# Patient Record
Sex: FEMALE | Race: WHITE | NOT HISPANIC OR LATINO
[De-identification: names, ages, dates, MRNs, and addresses within clinical notes are randomized per-mention and may not be internally consistent; named-entity substitution may affect disease eponyms.]

---

## 2017-03-16 ENCOUNTER — APPOINTMENT (OUTPATIENT)
Dept: GYNECOLOGIC ONCOLOGY | Facility: CLINIC | Age: 82
End: 2017-03-16

## 2017-03-16 VITALS
WEIGHT: 142 LBS | BODY MASS INDEX: 26.13 KG/M2 | SYSTOLIC BLOOD PRESSURE: 120 MMHG | DIASTOLIC BLOOD PRESSURE: 80 MMHG | HEIGHT: 62 IN

## 2017-09-28 ENCOUNTER — APPOINTMENT (OUTPATIENT)
Dept: GYNECOLOGIC ONCOLOGY | Facility: CLINIC | Age: 82
End: 2017-09-28
Payer: MEDICARE

## 2017-09-28 PROCEDURE — 99213 OFFICE O/P EST LOW 20 MIN: CPT

## 2018-03-29 ENCOUNTER — APPOINTMENT (OUTPATIENT)
Dept: GYNECOLOGIC ONCOLOGY | Facility: CLINIC | Age: 83
End: 2018-03-29
Payer: MEDICARE

## 2018-03-29 VITALS
DIASTOLIC BLOOD PRESSURE: 80 MMHG | SYSTOLIC BLOOD PRESSURE: 110 MMHG | WEIGHT: 145 LBS | BODY MASS INDEX: 26.68 KG/M2 | HEIGHT: 62 IN

## 2018-03-29 PROCEDURE — 99213 OFFICE O/P EST LOW 20 MIN: CPT

## 2018-03-29 RX ORDER — NIFEDIPINE 30 MG/1
30 TABLET, FILM COATED, EXTENDED RELEASE ORAL
Qty: 90 | Refills: 0 | Status: COMPLETED | COMMUNITY
Start: 2018-01-15

## 2018-03-29 RX ORDER — AMOXICILLIN 500 MG/1
500 TABLET, FILM COATED ORAL
Qty: 30 | Refills: 0 | Status: COMPLETED | COMMUNITY
Start: 2017-11-10

## 2018-03-29 RX ORDER — AZITHROMYCIN 250 MG/1
250 TABLET, FILM COATED ORAL
Qty: 6 | Refills: 0 | Status: COMPLETED | COMMUNITY
Start: 2018-01-16

## 2018-03-29 RX ORDER — METHYLPREDNISOLONE 4 MG/1
4 TABLET ORAL
Qty: 21 | Refills: 0 | Status: COMPLETED | COMMUNITY
Start: 2018-01-16

## 2018-03-29 RX ORDER — POTASSIUM CHLORIDE 1500 MG/1
20 TABLET, EXTENDED RELEASE ORAL
Qty: 90 | Refills: 0 | Status: ACTIVE | COMMUNITY
Start: 2018-01-24

## 2018-03-29 RX ORDER — LEVOFLOXACIN 250 MG/1
250 TABLET, FILM COATED ORAL
Qty: 10 | Refills: 0 | Status: COMPLETED | COMMUNITY
Start: 2017-12-11

## 2018-10-11 ENCOUNTER — APPOINTMENT (OUTPATIENT)
Dept: GYNECOLOGIC ONCOLOGY | Facility: CLINIC | Age: 83
End: 2018-10-11
Payer: MEDICARE

## 2018-10-11 VITALS
BODY MASS INDEX: 26.31 KG/M2 | WEIGHT: 143 LBS | DIASTOLIC BLOOD PRESSURE: 78 MMHG | HEIGHT: 62 IN | SYSTOLIC BLOOD PRESSURE: 125 MMHG

## 2018-10-11 DIAGNOSIS — N89.8 OTHER SPECIFIED NONINFLAMMATORY DISORDERS OF VAGINA: ICD-10-CM

## 2018-10-11 PROCEDURE — 99213 OFFICE O/P EST LOW 20 MIN: CPT

## 2019-04-11 ENCOUNTER — APPOINTMENT (OUTPATIENT)
Dept: GYNECOLOGIC ONCOLOGY | Facility: CLINIC | Age: 84
End: 2019-04-11
Payer: MEDICARE

## 2019-04-11 VITALS
WEIGHT: 147 LBS | BODY MASS INDEX: 27.05 KG/M2 | HEART RATE: 74 BPM | DIASTOLIC BLOOD PRESSURE: 83 MMHG | HEIGHT: 62 IN | SYSTOLIC BLOOD PRESSURE: 148 MMHG

## 2019-04-11 PROCEDURE — 99213 OFFICE O/P EST LOW 20 MIN: CPT

## 2020-04-09 ENCOUNTER — APPOINTMENT (OUTPATIENT)
Dept: GYNECOLOGIC ONCOLOGY | Facility: CLINIC | Age: 85
End: 2020-04-09

## 2020-07-02 ENCOUNTER — APPOINTMENT (OUTPATIENT)
Dept: GYNECOLOGIC ONCOLOGY | Facility: CLINIC | Age: 85
End: 2020-07-02
Payer: MEDICARE

## 2020-07-02 VITALS
DIASTOLIC BLOOD PRESSURE: 67 MMHG | HEART RATE: 78 BPM | WEIGHT: 145 LBS | BODY MASS INDEX: 26.52 KG/M2 | SYSTOLIC BLOOD PRESSURE: 136 MMHG

## 2020-07-02 PROCEDURE — 99213 OFFICE O/P EST LOW 20 MIN: CPT

## 2021-07-07 PROBLEM — Z08 ENCOUNTER FOR FOLLOW-UP SURVEILLANCE OF UTERINE CANCER: Status: ACTIVE | Noted: 2021-07-07

## 2021-07-08 ENCOUNTER — APPOINTMENT (OUTPATIENT)
Dept: GYNECOLOGIC ONCOLOGY | Facility: CLINIC | Age: 86
End: 2021-07-08
Payer: MEDICARE

## 2021-07-08 VITALS
HEIGHT: 62 IN | BODY MASS INDEX: 26.13 KG/M2 | SYSTOLIC BLOOD PRESSURE: 128 MMHG | WEIGHT: 142 LBS | DIASTOLIC BLOOD PRESSURE: 77 MMHG | HEART RATE: 59 BPM

## 2021-07-08 DIAGNOSIS — Z85.42 ENCOUNTER FOR FOLLOW-UP EXAMINATION AFTER COMPLETED TREATMENT FOR MALIGNANT NEOPLASM: ICD-10-CM

## 2021-07-08 DIAGNOSIS — Z08 ENCOUNTER FOR FOLLOW-UP EXAMINATION AFTER COMPLETED TREATMENT FOR MALIGNANT NEOPLASM: ICD-10-CM

## 2021-07-08 PROCEDURE — 99213 OFFICE O/P EST LOW 20 MIN: CPT

## 2021-07-08 PROCEDURE — 99072 ADDL SUPL MATRL&STAF TM PHE: CPT

## 2022-05-24 ENCOUNTER — APPOINTMENT (OUTPATIENT)
Dept: UROLOGY | Facility: CLINIC | Age: 87
End: 2022-05-24
Payer: MEDICARE

## 2022-05-24 VITALS
TEMPERATURE: 97.5 F | WEIGHT: 140 LBS | HEART RATE: 85 BPM | DIASTOLIC BLOOD PRESSURE: 72 MMHG | OXYGEN SATURATION: 99 % | HEIGHT: 62 IN | SYSTOLIC BLOOD PRESSURE: 119 MMHG | BODY MASS INDEX: 25.76 KG/M2

## 2022-05-24 DIAGNOSIS — Z87.891 PERSONAL HISTORY OF NICOTINE DEPENDENCE: ICD-10-CM

## 2022-05-24 DIAGNOSIS — Z78.9 OTHER SPECIFIED HEALTH STATUS: ICD-10-CM

## 2022-05-24 PROCEDURE — 99204 OFFICE O/P NEW MOD 45 MIN: CPT

## 2022-05-24 RX ORDER — MIRABEGRON 25 MG/1
25 TABLET, FILM COATED, EXTENDED RELEASE ORAL
Qty: 30 | Refills: 1 | Status: DISCONTINUED | COMMUNITY
Start: 2022-05-24 | End: 2022-05-24

## 2022-05-24 NOTE — HISTORY OF PRESENT ILLNESS
[FreeTextEntry1] : Very pleasant 86-year-old woman who presents for evaluation of increased urinary frequency and low volume voids.  She reports that this started approximately 3 to 4 weeks ago.  She has tried nitrofurantoin as well as Augmentin which did not improve her symptoms.  Recent urinalysis demonstrates no evidence of microscopic hematuria nor urinary tract infection and a urine culture was negative.  She denies dysuria.  No flank pain or suprapubic pain.  She is very bothered by this.  Nocturia x0.  Quit smoking over 30 years ago.

## 2022-05-24 NOTE — ASSESSMENT
[FreeTextEntry1] : Very pleasant 86-year-old woman who presents for evaluation of increased urinary frequency, low volume voids\par -PVR today 30 cc\par -We had an extensive discussion regarding potential etiologies of her symptoms\par -Urine culture negative\par -Urinalysis demonstrates no evidence of microscopic hematuria\par -Trial of mirabegron 25 mg\par -We discussed risks and benefits of mirabegron, including need to check blood pressure more frequently at the beginning of treatment\par -Follow-up in 3 to 4 weeks\par \par Addendum: Patient reports that her co-pay for Myrbetriq is $300 per month.  Because of this we will prescribe trospium instead.  Prescription for trospium sent to the pharmacy\par I discussed the risks, benefits, alternatives, and possible side effects of trospium therapy with the patient, including but not limited to urinary retention, dry mouth, dry eyes, constipation, and confusion.  Patient understands that he must call immediately should any of these symptoms occur

## 2022-05-25 ENCOUNTER — RX CHANGE (OUTPATIENT)
Age: 87
End: 2022-05-25

## 2022-05-29 ENCOUNTER — NON-APPOINTMENT (OUTPATIENT)
Age: 87
End: 2022-05-29

## 2022-05-30 ENCOUNTER — NON-APPOINTMENT (OUTPATIENT)
Age: 87
End: 2022-05-30

## 2022-05-31 ENCOUNTER — APPOINTMENT (OUTPATIENT)
Dept: UROLOGY | Facility: CLINIC | Age: 87
End: 2022-05-31
Payer: MEDICARE

## 2022-05-31 ENCOUNTER — NON-APPOINTMENT (OUTPATIENT)
Age: 87
End: 2022-05-31

## 2022-05-31 ENCOUNTER — INPATIENT (INPATIENT)
Facility: HOSPITAL | Age: 87
LOS: 1 days | Discharge: HOME | End: 2022-06-02
Attending: GENERAL PRACTICE | Admitting: GENERAL PRACTICE
Payer: MEDICARE

## 2022-05-31 VITALS — OXYGEN SATURATION: 98 % | SYSTOLIC BLOOD PRESSURE: 97 MMHG | DIASTOLIC BLOOD PRESSURE: 83 MMHG | HEART RATE: 100 BPM

## 2022-05-31 VITALS
RESPIRATION RATE: 18 BRPM | SYSTOLIC BLOOD PRESSURE: 117 MMHG | HEART RATE: 87 BPM | OXYGEN SATURATION: 100 % | TEMPERATURE: 98 F | HEIGHT: 64 IN | DIASTOLIC BLOOD PRESSURE: 89 MMHG

## 2022-05-31 VITALS — HEART RATE: 101 BPM | SYSTOLIC BLOOD PRESSURE: 82 MMHG | DIASTOLIC BLOOD PRESSURE: 58 MMHG

## 2022-05-31 VITALS — HEART RATE: 102 BPM | DIASTOLIC BLOOD PRESSURE: 62 MMHG | SYSTOLIC BLOOD PRESSURE: 82 MMHG | OXYGEN SATURATION: 98 %

## 2022-05-31 DIAGNOSIS — Z01.818 ENCOUNTER FOR OTHER PREPROCEDURAL EXAMINATION: Chronic | ICD-10-CM

## 2022-05-31 DIAGNOSIS — N81.9 FEMALE GENITAL PROLAPSE, UNSPECIFIED: ICD-10-CM

## 2022-05-31 DIAGNOSIS — R55 SYNCOPE AND COLLAPSE: ICD-10-CM

## 2022-05-31 DIAGNOSIS — S82.899A OTHER FRACTURE OF UNSPECIFIED LOWER LEG, INITIAL ENCOUNTER FOR CLOSED FRACTURE: Chronic | ICD-10-CM

## 2022-05-31 LAB
ALBUMIN SERPL ELPH-MCNC: 3.7 G/DL — SIGNIFICANT CHANGE UP (ref 3.3–5)
ALP SERPL-CCNC: 92 U/L — SIGNIFICANT CHANGE UP (ref 40–120)
ALT FLD-CCNC: 13 U/L — SIGNIFICANT CHANGE UP (ref 4–33)
ANION GAP SERPL CALC-SCNC: 14 MMOL/L — SIGNIFICANT CHANGE UP (ref 7–14)
APPEARANCE UR: CLEAR — SIGNIFICANT CHANGE UP
AST SERPL-CCNC: 15 U/L — SIGNIFICANT CHANGE UP (ref 4–32)
BACTERIA # UR AUTO: NEGATIVE — SIGNIFICANT CHANGE UP
BASOPHILS # BLD AUTO: 0.02 K/UL — SIGNIFICANT CHANGE UP (ref 0–0.2)
BASOPHILS NFR BLD AUTO: 0.1 % — SIGNIFICANT CHANGE UP (ref 0–2)
BILIRUB SERPL-MCNC: 0.5 MG/DL — SIGNIFICANT CHANGE UP (ref 0.2–1.2)
BILIRUB UR-MCNC: NEGATIVE — SIGNIFICANT CHANGE UP
BUN SERPL-MCNC: 32 MG/DL — HIGH (ref 7–23)
CALCIUM SERPL-MCNC: 9.4 MG/DL — SIGNIFICANT CHANGE UP (ref 8.4–10.5)
CHLORIDE SERPL-SCNC: 96 MMOL/L — LOW (ref 98–107)
CO2 SERPL-SCNC: 23 MMOL/L — SIGNIFICANT CHANGE UP (ref 22–31)
COLOR SPEC: YELLOW — SIGNIFICANT CHANGE UP
CREAT SERPL-MCNC: 1.13 MG/DL — SIGNIFICANT CHANGE UP (ref 0.5–1.3)
DIFF PNL FLD: ABNORMAL
EGFR: 48 ML/MIN/1.73M2 — LOW
EOSINOPHIL # BLD AUTO: 0.05 K/UL — SIGNIFICANT CHANGE UP (ref 0–0.5)
EOSINOPHIL NFR BLD AUTO: 0.3 % — SIGNIFICANT CHANGE UP (ref 0–6)
EPI CELLS # UR: 3 /HPF — SIGNIFICANT CHANGE UP (ref 0–5)
GLUCOSE SERPL-MCNC: 120 MG/DL — HIGH (ref 70–99)
GLUCOSE UR QL: ABNORMAL
HCT VFR BLD CALC: 41.7 % — SIGNIFICANT CHANGE UP (ref 34.5–45)
HGB BLD-MCNC: 13.5 G/DL — SIGNIFICANT CHANGE UP (ref 11.5–15.5)
HYALINE CASTS # UR AUTO: SIGNIFICANT CHANGE UP /LPF (ref 0–7)
IANC: 12.43 K/UL — HIGH (ref 1.8–7.4)
IMM GRANULOCYTES NFR BLD AUTO: 0.5 % — SIGNIFICANT CHANGE UP (ref 0–1.5)
KETONES UR-MCNC: NEGATIVE — SIGNIFICANT CHANGE UP
LEUKOCYTE ESTERASE UR-ACNC: NEGATIVE — SIGNIFICANT CHANGE UP
LYMPHOCYTES # BLD AUTO: 16.6 % — SIGNIFICANT CHANGE UP (ref 13–44)
LYMPHOCYTES # BLD AUTO: 2.75 K/UL — SIGNIFICANT CHANGE UP (ref 1–3.3)
MCHC RBC-ENTMCNC: 30.8 PG — SIGNIFICANT CHANGE UP (ref 27–34)
MCHC RBC-ENTMCNC: 32.4 GM/DL — SIGNIFICANT CHANGE UP (ref 32–36)
MCV RBC AUTO: 95 FL — SIGNIFICANT CHANGE UP (ref 80–100)
MONOCYTES # BLD AUTO: 1.24 K/UL — HIGH (ref 0–0.9)
MONOCYTES NFR BLD AUTO: 7.5 % — SIGNIFICANT CHANGE UP (ref 2–14)
NEUTROPHILS # BLD AUTO: 12.43 K/UL — HIGH (ref 1.8–7.4)
NEUTROPHILS NFR BLD AUTO: 75 % — SIGNIFICANT CHANGE UP (ref 43–77)
NITRITE UR-MCNC: NEGATIVE — SIGNIFICANT CHANGE UP
NRBC # BLD: 0 /100 WBCS — SIGNIFICANT CHANGE UP
NRBC # FLD: 0 K/UL — SIGNIFICANT CHANGE UP
PH UR: 6 — SIGNIFICANT CHANGE UP (ref 5–8)
PLATELET # BLD AUTO: 254 K/UL — SIGNIFICANT CHANGE UP (ref 150–400)
POTASSIUM SERPL-MCNC: 3.3 MMOL/L — LOW (ref 3.5–5.3)
POTASSIUM SERPL-SCNC: 3.3 MMOL/L — LOW (ref 3.5–5.3)
PROT SERPL-MCNC: 6.2 G/DL — SIGNIFICANT CHANGE UP (ref 6–8.3)
PROT UR-MCNC: ABNORMAL
RBC # BLD: 4.39 M/UL — SIGNIFICANT CHANGE UP (ref 3.8–5.2)
RBC # FLD: 12.8 % — SIGNIFICANT CHANGE UP (ref 10.3–14.5)
RBC CASTS # UR COMP ASSIST: 6 /HPF — HIGH (ref 0–4)
SODIUM SERPL-SCNC: 133 MMOL/L — LOW (ref 135–145)
SP GR SPEC: 1.01 — SIGNIFICANT CHANGE UP (ref 1–1.05)
TROPONIN T, HIGH SENSITIVITY RESULT: 19 NG/L — SIGNIFICANT CHANGE UP
TROPONIN T, HIGH SENSITIVITY RESULT: 22 NG/L — SIGNIFICANT CHANGE UP
UROBILINOGEN FLD QL: SIGNIFICANT CHANGE UP
WBC # BLD: 16.58 K/UL — HIGH (ref 3.8–10.5)
WBC # FLD AUTO: 16.58 K/UL — HIGH (ref 3.8–10.5)
WBC UR QL: 2 /HPF — SIGNIFICANT CHANGE UP (ref 0–5)

## 2022-05-31 PROCEDURE — 93010 ELECTROCARDIOGRAM REPORT: CPT

## 2022-05-31 PROCEDURE — 99215 OFFICE O/P EST HI 40 MIN: CPT

## 2022-05-31 PROCEDURE — 99285 EMERGENCY DEPT VISIT HI MDM: CPT | Mod: 25

## 2022-05-31 RX ORDER — ONDANSETRON 8 MG/1
4 TABLET, FILM COATED ORAL ONCE
Refills: 0 | Status: COMPLETED | OUTPATIENT
Start: 2022-05-31 | End: 2022-05-31

## 2022-05-31 RX ORDER — SODIUM CHLORIDE 9 MG/ML
1000 INJECTION INTRAMUSCULAR; INTRAVENOUS; SUBCUTANEOUS ONCE
Refills: 0 | Status: COMPLETED | OUTPATIENT
Start: 2022-05-31 | End: 2022-05-31

## 2022-05-31 RX ADMIN — SODIUM CHLORIDE 1000 MILLILITER(S): 9 INJECTION INTRAMUSCULAR; INTRAVENOUS; SUBCUTANEOUS at 23:07

## 2022-05-31 NOTE — ASSESSMENT
[FreeTextEntry1] : Very pleasant 86-year-old woman who presents for follow-up of increased urinary frequency, low volume voids\par -Patient was unable to fill prescription for mirabegron due to the cost of the medication.  She reports dry mouth, dry eyes, and constipation with trospium and therefore stopped taking the medication\par -We discussed alternative etiologies of her symptoms, including pelvic organ prolapse.\par -I recommended that she see a specialist in female urology and I have provided her with a reference to do so\par -Patient had a witnessed syncopal episode in the office today without loss of consciousness no trauma.  EMS was called.  She was taken to [facility].  Patient reports numerous similar episodes both at home and in doctors offices over the last month.\par

## 2022-05-31 NOTE — PHYSICAL EXAM
[General Appearance - Well Developed] : well developed [General Appearance - Well Nourished] : well nourished [Normal Appearance] : normal appearance [Well Groomed] : well groomed [Abdomen Soft] : soft [Abdomen Tenderness] : non-tender [Costovertebral Angle Tenderness] : no ~M costovertebral angle tenderness [Urinary Bladder Findings] : the bladder was normal on palpation [FreeTextEntry1] : Diaphoretic [] : no respiratory distress [Respiration, Rhythm And Depth] : normal respiratory rhythm and effort [Exaggerated Use Of Accessory Muscles For Inspiration] : no accessory muscle use [Oriented To Time, Place, And Person] : oriented to person, place, and time [Affect] : the affect was normal [Mood] : the mood was normal [Not Anxious] : not anxious [Normal Station and Gait] : the gait and station were normal for the patient's age

## 2022-05-31 NOTE — ED PROVIDER NOTE - CLINICAL SUMMARY MEDICAL DECISION MAKING FREE TEXT BOX
86 year old F with PMH uterine cancer, HLD, HTN, GERD BIBEMS from urology office after a syncopal event. VSS, afebrile. No FND on exam. Normotensive, therefore unlikely orthostatic. Vasovagal vs cardiogenic possible. Will get basics, trop, BNP, EKG. UA given recent urinary dribbling and incomplete voiding. Likely admit for further cardiac workup.

## 2022-05-31 NOTE — ED ADULT TRIAGE NOTE - CHIEF COMPLAINT QUOTE
Pt kristine in by EMS from MD office for a witnessed syncopal episode. Pt states she felt weak prior to syncope. Pt denies chest pain, sob, n/v/d, fever or chills.  by EMS.

## 2022-05-31 NOTE — ED PROVIDER NOTE - PHYSICAL EXAMINATION
GENERAL: Awake, alert, NAD  HEENT: NC/AT, moist mucous membranes, PERRL, EOMI  LUNGS: CTAB, no wheezes or crackles   CARDIAC: RRR, no m/r/g  ABDOMEN: Soft, normal BS, non tender, non distended, no rebound, no guarding  BACK: No midline spinal tenderness, no CVA tenderness  EXT: No edema, no calf tenderness, 2+ DP pulses bilaterally, no deformities.  NEURO: A&Ox3. Moving all extremities. 5/5 strength UE and LE bilaterally. Sensation intact.   SKIN: Warm and dry. No rash.  PSYCH: Normal affect. GENERAL: Awake, alert, NAD  HEENT: NC/AT, moist mucous membranes, PERRL, EOMI  LUNGS: CTAB, no wheezes or crackles   CARDIAC: RRR, no m/r/g  ABDOMEN: Soft, normal BS, non tender, non distended, no rebound, no guarding  BACK: No midline spinal tenderness, no CVA tenderness  EXT: No edema, no calf tenderness, 2+ DP pulses bilaterally, no deformities.  NEURO: A&Ox3. Moving all extremities. 5/5 strength UE and LE bilaterally. Sensation intact. Normal cerebellar exam.   SKIN: Warm and dry. No rash.  PSYCH: Normal affect. GENERAL: Awake, alert, NAD  HEENT: NC/AT, moist mucous membranes, PERRL, EOMI  LUNGS: CTAB, no wheezes or crackles   CARDIAC: RRR, no m/r/g  ABDOMEN: Soft, normal BS, non tender, non distended, no rebound, no guarding  BACK: No midline spinal tenderness, no CVA tenderness  EXT: No edema, no calf tenderness, 2+ DP pulses bilaterally, no deformities.  NEURO: A&Ox3. Moving all extremities. 5/5 strength UE and LE bilaterally. Sensation intact. Normal cerebellar exam.  CN III-XII grossly intact.   SKIN: Warm and dry. No rash.  PSYCH: Normal affect.

## 2022-05-31 NOTE — ED PROVIDER NOTE - OBJECTIVE STATEMENT
86 year old F with PMH ... syncope at uro, 4 episode son 4 weeks, no head trauma or LOC, no prodrome. worked up, told 86 year old F with PMH uterine cancer, HLD, HTN, GERD BIBEMS from urology office after a syncopal event. Patient was standing at desk in office, passed out, sister prevented her from hitting head as she went to floor. Patient denies preceding CP, SOB or palpitations. Has had at least 4 episodes in past two seeks. No head trauma. Been worked up, told it was orthostatic syncope. Unsure of last cardiac workup. Denies history of PE or DVT, CP, SOB, palpitations, N/V/D, fevers, HA.

## 2022-05-31 NOTE — ED ADULT NURSE NOTE - OBJECTIVE STATEMENT
Pt awake and alert, PMH endometrial CA presenting from urologist office after witnessed syncopal episode. Pt states sister helped her to the chair, no fall/ head trauma. EKG done was normal. Pt denies cp, dizziness, sob. Pt was being treated with ABX for possible UTI with no relief. Pt endorsing trouble urinating. Pr denies hematuria/ dysuria. Pt abdomen soft non distended, skin intact. Call bell in reach.

## 2022-05-31 NOTE — ED PROVIDER NOTE - ATTENDING CONTRIBUTION TO CARE
Brief HPI:  85 yo F past medical history of  uterine cancer, HLD, HTN, GERD BIBEMS from urology office for a syncope evaluation.  Patient reports witnessed loss of consciousness without prodrome.  Denies head trauma.  Currently asymptomatic.  She reports 3-4 previous episodes of syncope in last two months.      Vitals:   Reviewed    Exam:    GEN:  Non-toxic appearing, non-distressed, speaking full sentences, non-diaphoretic, AAOx3  HEENT:  NCAT, neck supple, EOMI, PERRLA, sclera anicteric, no conjunctival pallor or injection, no stridor, normal voice, no tonsillar exudate, uvula midline  CV:  regular rhythm and rate, s1/s2 audible, no murmurs, rubs or gallops, peripheral pulses 2+ and symmetric  PULM:  non-labored respirations, lungs clear to auscultation bilaterally, no wheezes, crackles or rales  ABD:  non distended, non-tender, no rebound, no guarding, negative Steven's sign, bowel sounds normal, no cvat  MSK:  no gross deformity, non-tender extremities and joints, range of motion grossly normal appearing, no extremity edema, extremities warm and well perfused   NEURO:  AAOx3, CN II-XII intact, motor 5/5 in upper and lower extremities bilaterally, sensation grossly intact in extremities and trunk, finger to nose testing wnl, no nystagmus, negative Romberg, no pronator drift, no gait deficit  SKIN:  warm, dry, no rash or vesicles     A/P:   85 yo F past medical history of  uterine cancer, HLD, HTN, GERD BIBEMS from urology office for a syncope evaluation.  EKG non-ischemic; no morphology consistent with Brugada syndrome, Wesley-Parkinson-White syndrome, prolonged qt interval, HOCM, or arrythmogenic right ventricular hyperplasia.  VSS.  Possible cardiac etiology given multiple episodes.  Will send labs.  Anticipate admission.

## 2022-05-31 NOTE — HISTORY OF PRESENT ILLNESS
[FreeTextEntry1] : Very pleasant 86-year-old woman who presents for follow-up of increased urinary frequency and low volume voids.  Recent urinalysis demonstrates no evidence of microscopic hematuria nor urinary tract infection and a urine culture was negative.  She was previously started on nitrofurantoin and Augmentin, neither of which significantly improved her symptoms.  She denies dysuria.  No flank pain or suprapubic pain.  \par \par She was recently prescribed mirabegron, however due to the cost of the medication she was unable to fill the prescription.  She was therefore prescribed trospium but reports dry mouth and dry eyes and therefore stopped taking the medication.\par \par In the office today patient had a witnessed syncopal episode without loss of consciousness.  EMS was called.  She was taken to [facility].  Upon further questioning patient reports numerous syncopal episodes both at home and in another doctor's office in the recent past.

## 2022-05-31 NOTE — ED PROVIDER NOTE - NSICDXPASTMEDICALHX_GEN_ALL_CORE_FT
PAST MEDICAL HISTORY:  Endometrial cancer     [place] (hard of hearing) left > right - no hearing aids    Hyperlipidemia     Hypertension

## 2022-05-31 NOTE — ED PROVIDER NOTE - NS ED ROS FT
CONST: no fevers, no chills  EYES: no pain, no vision changes  ENT: no sore throat, no ear pain, no change in hearing  CV: no chest pain, no leg swelling  RESP: no shortness of breath, no cough  ABD: no abdominal pain, no nausea, no vomiting, no diarrhea  : no dysuria, no flank pain, no hematuria  MSK: no back pain, no extremity pain  NEURO: no headache, no additional neurologic complaints, + syncope  HEME: no easy bleeding  SKIN:  no rash

## 2022-05-31 NOTE — ED PROVIDER NOTE - NSICDXPASTSURGICALHX_GEN_ALL_CORE_FT
PAST SURGICAL HISTORY:  Ankle fracture left - s/p ORIF    Encounter for biopsy 4/2014 - endometrial biopsy

## 2022-06-01 DIAGNOSIS — E86.0 DEHYDRATION: ICD-10-CM

## 2022-06-01 DIAGNOSIS — I10 ESSENTIAL (PRIMARY) HYPERTENSION: ICD-10-CM

## 2022-06-01 DIAGNOSIS — K21.9 GASTRO-ESOPHAGEAL REFLUX DISEASE WITHOUT ESOPHAGITIS: ICD-10-CM

## 2022-06-01 DIAGNOSIS — R55 SYNCOPE AND COLLAPSE: ICD-10-CM

## 2022-06-01 DIAGNOSIS — D72.829 ELEVATED WHITE BLOOD CELL COUNT, UNSPECIFIED: ICD-10-CM

## 2022-06-01 LAB
FLUAV AG NPH QL: SIGNIFICANT CHANGE UP
FLUBV AG NPH QL: SIGNIFICANT CHANGE UP
RSV RNA NPH QL NAA+NON-PROBE: SIGNIFICANT CHANGE UP
SARS-COV-2 RNA SPEC QL NAA+PROBE: SIGNIFICANT CHANGE UP

## 2022-06-01 PROCEDURE — 71045 X-RAY EXAM CHEST 1 VIEW: CPT | Mod: 26

## 2022-06-01 PROCEDURE — 76770 US EXAM ABDO BACK WALL COMP: CPT | Mod: 26

## 2022-06-01 PROCEDURE — 99233 SBSQ HOSP IP/OBS HIGH 50: CPT

## 2022-06-01 PROCEDURE — 93306 TTE W/DOPPLER COMPLETE: CPT | Mod: 26

## 2022-06-01 RX ORDER — POTASSIUM CHLORIDE 20 MEQ
40 PACKET (EA) ORAL ONCE
Refills: 0 | Status: COMPLETED | OUTPATIENT
Start: 2022-06-01 | End: 2022-06-01

## 2022-06-01 RX ORDER — ONDANSETRON 8 MG/1
4 TABLET, FILM COATED ORAL EVERY 8 HOURS
Refills: 0 | Status: DISCONTINUED | OUTPATIENT
Start: 2022-06-01 | End: 2022-06-02

## 2022-06-01 RX ORDER — ASPIRIN/CALCIUM CARB/MAGNESIUM 324 MG
81 TABLET ORAL DAILY
Refills: 0 | Status: DISCONTINUED | OUTPATIENT
Start: 2022-06-01 | End: 2022-06-02

## 2022-06-01 RX ORDER — ACETAMINOPHEN 500 MG
650 TABLET ORAL EVERY 6 HOURS
Refills: 0 | Status: DISCONTINUED | OUTPATIENT
Start: 2022-06-01 | End: 2022-06-02

## 2022-06-01 RX ORDER — NIFEDIPINE 30 MG
30 TABLET, EXTENDED RELEASE 24 HR ORAL DAILY
Refills: 0 | Status: DISCONTINUED | OUTPATIENT
Start: 2022-06-01 | End: 2022-06-02

## 2022-06-01 RX ORDER — ENOXAPARIN SODIUM 100 MG/ML
40 INJECTION SUBCUTANEOUS EVERY 24 HOURS
Refills: 0 | Status: DISCONTINUED | OUTPATIENT
Start: 2022-06-01 | End: 2022-06-02

## 2022-06-01 RX ORDER — LOSARTAN POTASSIUM 100 MG/1
100 TABLET, FILM COATED ORAL DAILY
Refills: 0 | Status: DISCONTINUED | OUTPATIENT
Start: 2022-06-01 | End: 2022-06-02

## 2022-06-01 RX ORDER — LANOLIN ALCOHOL/MO/W.PET/CERES
3 CREAM (GRAM) TOPICAL AT BEDTIME
Refills: 0 | Status: DISCONTINUED | OUTPATIENT
Start: 2022-06-01 | End: 2022-06-02

## 2022-06-01 RX ORDER — SIMVASTATIN 20 MG/1
10 TABLET, FILM COATED ORAL AT BEDTIME
Refills: 0 | Status: DISCONTINUED | OUTPATIENT
Start: 2022-06-01 | End: 2022-06-02

## 2022-06-01 RX ORDER — FAMOTIDINE 10 MG/ML
20 INJECTION INTRAVENOUS DAILY
Refills: 0 | Status: DISCONTINUED | OUTPATIENT
Start: 2022-06-01 | End: 2022-06-02

## 2022-06-01 RX ORDER — SODIUM CHLORIDE 9 MG/ML
1000 INJECTION INTRAMUSCULAR; INTRAVENOUS; SUBCUTANEOUS
Refills: 0 | Status: DISCONTINUED | OUTPATIENT
Start: 2022-06-01 | End: 2022-06-02

## 2022-06-01 RX ADMIN — SODIUM CHLORIDE 1000 MILLILITER(S): 9 INJECTION INTRAMUSCULAR; INTRAVENOUS; SUBCUTANEOUS at 00:07

## 2022-06-01 RX ADMIN — LOSARTAN POTASSIUM 100 MILLIGRAM(S): 100 TABLET, FILM COATED ORAL at 21:23

## 2022-06-01 RX ADMIN — SODIUM CHLORIDE 75 MILLILITER(S): 9 INJECTION INTRAMUSCULAR; INTRAVENOUS; SUBCUTANEOUS at 18:25

## 2022-06-01 RX ADMIN — Medication 40 MILLIEQUIVALENT(S): at 21:22

## 2022-06-01 RX ADMIN — SIMVASTATIN 10 MILLIGRAM(S): 20 TABLET, FILM COATED ORAL at 21:22

## 2022-06-01 RX ADMIN — ONDANSETRON 4 MILLIGRAM(S): 8 TABLET, FILM COATED ORAL at 00:09

## 2022-06-01 RX ADMIN — Medication 30 MILLIGRAM(S): at 21:22

## 2022-06-01 RX ADMIN — Medication 81 MILLIGRAM(S): at 13:25

## 2022-06-01 RX ADMIN — FAMOTIDINE 20 MILLIGRAM(S): 10 INJECTION INTRAVENOUS at 13:25

## 2022-06-01 NOTE — CONSULT NOTE ADULT - ASSESSMENT
Patient is a 86y old  Female with a PMH uterine cancer, HLD, HTN, GERD BIBEMS from urology office for a syncope evaluation. Patient states she was standing at desk in office and suddenly passed out for few secs, witnessed by her sister whom lowed her down to the floor to prevent her falling. Denies preceding sxs of CP, SOB, palpitations or dizziness. Reports similar episodes prior. No hx of heart disease/ arrythmia. EP is consulted for syncope. EKG with SR with APCs at 93Bpm, Tele recorded SR at 100s APCs and PVCs.     Syncope    RECOMMENDATIONS:  - check orthostatic V/S  - obtain Echo to assess LV/RV function  - Continuous telemetric monitoring for arrhythmia  - Monitor electrolytes and replete K to 4 and Mg to 2  - Continue care per primary team    Patient to be staffed with attending. Please await attending addendum

## 2022-06-01 NOTE — H&P ADULT - ASSESSMENT
Patient is an 85 yo woman with past medical history of  uterine cancer and ? bladder prolapse on recent 3 courses of abx for UTI, HLD, HTN, GERD BIBEMS from urology office for a syncope evaluation.

## 2022-06-01 NOTE — H&P ADULT - PROBLEM SELECTOR PLAN 1
unclear etiology  possible vasovagal / orthostatic cause given clinically dehydration, recent UTI, pain/ discomfort related to bladder and prior report of nausea ...  appreciated Cardio and EP consults  orthostatics  Echo  PT eval  IVH  monitor vitals, labs  tele

## 2022-06-01 NOTE — CONSULT NOTE ADULT - SUBJECTIVE AND OBJECTIVE BOX
Source: patient and Chart    HPI:  Patient is a 86y old  Female with a PMH uterine cancer, HLD, HTN, GERD BIBEMS from urology office for a syncope evaluation. Patient states she was standing at desk in office and suddenly passed out, witnessed by her sister whom lowed her down to the floor to prevent her falling. Patient denies preceding sxs of CP, SOB, palpitations or dizziness. Denies symptoms after the episode. Endorsed she has had at least 3-4 similar episodes in past two weeks during which she had vomiting after the syncope. Denies head trauma, seizure or hx of heart disease/ arrythmia. Pt. states she has been following up with her doctor for her syncope workup and was told it was orthostatic syncope. Admits she was treated with antibiotic for her UTI last week. EP is consulted for syncope. EKG with SR with APCs at 93Bpm, Tele recorded SR at 100s APCs and PVCs.         ED course: Trop : 22->19; K 3.3, Na 133    PAST MEDICAL & SURGICAL HISTORY:  Hypertension      Hyperlipidemia      Endometrial cancer      [place] (hard of hearing)  left &gt; right - no hearing aids      Encounter for biopsy  2014 - endometrial biopsy      Ankle fracture  left - s/p ORIF            MEDICATIONS  (STANDING):    MEDICATIONS  (PRN):      FAMILY HISTORY:      SOCIAL HISTORY:    LIVING SITUATION:  CIGARETTES: Denied  ALCOHOL: denied   ILLICIT DRUG USES: denied    REVIEW OF SYSTEMS:  CONSTITUTIONAL: No fever, weight loss, chills, shakes, or fatigue, + Syncope  EYES: No eye pain, visual disturbances, or discharge  ENMT:  No difficulty hearing, tinnitus, vertigo; No sinus or throat pain  NECK: No pain or stiffness  RESPIRATORY: No cough, wheezing, hemoptysis, or shortness of breath  CARDIOVASCULAR: No chest pain, dyspnea, palpitations, dizziness, syncope, paroxysmal nocturnal dyspnea, orthopnea, or arm or leg swelling  GASTROINTESTINAL: No abdominal  or epigastric pain, nausea, vomiting, hematemesis, diarrhea, constipation, melena or bright red blood.  GENITOURINARY: No dysuria, nocturia, hematuria, or urinary incontinence  NEUROLOGICAL: No headaches, memory loss, slurred speech, limb weakness, loss of strength, numbness, or tremors  MUSCULOSKELETAL: No joint pain or swelling, muscle, back, or extremity pain  PSYCHIATRIC: No depression, anxiety, or difficulty sleeping        Vital Signs Last 24 Hrs  T(C): 36.5 (2022 06:52), Max: 36.7 (31 May 2022 16:44)  T(F): 97.7 (2022 06:52), Max: 98 (31 May 2022 16:44)  HR: 101 (2022 06:52) (87 - 113)  BP: 129/76 (2022 06:52) (117/89 - 155/68)  BP(mean): --  RR: 18 (2022 06:52) (13 - 18)  SpO2: 97% (2022 06:52) (97% - 100%)    PHYSICAL EXAM:  GENERAL: Well appearing, speaking in full sentence, in NAD  HEART: S1S2 RRR; systolic murmurs, rubs, or gallops appreciated .  PULMONARY:CTABL, normal respiratory effort.  No rales, wheezing, or rhonchi appreciated bilaterally  ABDOMEN: Bowel sounds present, soft, NDNT  EXTREMITIES:  Warm, well -perfused, no pedal edema, distal pulses present  NEUROLOGICAL:AOx3 ,  motor function grossly  intact    INTERPRETATION OF TELEMETRY: SR at 100s    ECG: SR at 93bpm, NV 128bpm, qrs 86, QT/QTc 374/465 ms    I&O's Detail      LABS:                        13.5   16.58 )-----------( 254      ( 31 May 2022 20:30 )             41.7         133<L>  |  96<L>  |  32<H>  ----------------------------<  120<H>  3.3<L>   |  23  |  1.13    Ca    9.4      31 May 2022 20:30  Phos  2.6       Mg     2.20         TPro  6.2  /  Alb  3.7  /  TBili  0.5  /  DBili  x   /  AST  15  /  ALT  13  /  AlkPhos  92            Urinalysis Basic - ( 31 May 2022 22:40 )    Color: Yellow / Appearance: Clear / S.014 / pH: x  Gluc: x / Ketone: Negative  / Bili: Negative / Urobili: <2 mg/dL   Blood: x / Protein: 30 mg/dL / Nitrite: Negative   Leuk Esterase: Negative / RBC: 6 /HPF / WBC 2 /HPF   Sq Epi: x / Non Sq Epi: 3 /HPF / Bacteria: Negative      BNP  I&O's Detail    Daily Height in cm: 162.56 (31 May 2022 16:44)    Daily     RADIOLOGY & ADDITIONAL STUDIES:        
CHIEF COMPLAINT: presyncope    HISTORY OF PRESENT ILLNESS:  86 year old F with PMH uterine cancer, HLD, HTN, GERD BIBEMS from urology office after a syncopal event. Patient was standing at desk in office, passed out, sister prevented her from hitting head as she went to floor. Patient denies preceding CP, SOB or palpitations. Has had at least 4 episodes in past two seeks. No head trauma. Been worked up, told it was orthostatic syncope. Unsure of last cardiac workup. Denies history of PE or DVT, CP, SOB, palpitations, N/V/D, fevers, HA.      Allergies    No Known Allergies    Intolerances    	    MEDICATIONS:  see med rec                PAST MEDICAL & SURGICAL HISTORY:  Hypertension      Hyperlipidemia      Endometrial cancer      [place] (hard of hearing)  left &gt; right - no hearing aids      Encounter for biopsy  4/2014 - endometrial biopsy      Ankle fracture  left - s/p ORIF          FAMILY HISTORY:      SOCIAL HISTORY:    non smoker. indep in adl      REVIEW OF SYSTEMS:  See HPI, otherwise complete 10 point review of systems negative    [ ] All others negative	      PHYSICAL EXAM:  T(C): 36.5 (06-01-22 @ 06:52), Max: 36.7 (05-31-22 @ 16:44)  HR: 101 (06-01-22 @ 06:52) (87 - 113)  BP: 129/76 (06-01-22 @ 06:52) (117/89 - 155/68)  RR: 18 (06-01-22 @ 06:52) (13 - 18)  SpO2: 97% (06-01-22 @ 06:52) (97% - 100%)  Wt(kg): --  I&O's Summary      Appearance: No Acute Distress	  HEENT:  Normal oral mucosa, PERRL, EOMI	  Cardiovascular: Normal S1 S2, No JVD, No murmurs/rubs/gallops  Respiratory: Lungs clear to auscultation bilaterally  Gastrointestinal:  Soft, Non-tender, + BS	  Skin: No rashes, No ecchymoses, No cyanosis	  Neurologic: Non-focal  Extremities: No clubbing, cyanosis or edema  Vascular: Peripheral pulses palpable 2+ bilaterally  Psychiatry: A & O x 3, Mood & affect appropriate    Laboratory Data:	 	    CBC Full  -  ( 31 May 2022 20:30 )  WBC Count : 16.58 K/uL  Hemoglobin : 13.5 g/dL  Hematocrit : 41.7 %  Platelet Count - Automated : 254 K/uL  Mean Cell Volume : 95.0 fL  Mean Cell Hemoglobin : 30.8 pg  Mean Cell Hemoglobin Concentration : 32.4 gm/dL  Auto Neutrophil # : 12.43 K/uL  Auto Lymphocyte # : 2.75 K/uL  Auto Monocyte # : 1.24 K/uL  Auto Eosinophil # : 0.05 K/uL  Auto Basophil # : 0.02 K/uL  Auto Neutrophil % : 75.0 %  Auto Lymphocyte % : 16.6 %  Auto Monocyte % : 7.5 %  Auto Eosinophil % : 0.3 %  Auto Basophil % : 0.1 %    05-31    133<L>  |  96<L>  |  32<H>  ----------------------------<  120<H>  3.3<L>   |  23  |  1.13    Ca    9.4      31 May 2022 20:30  Phos  2.6     05-31  Mg     2.20     05-31    TPro  6.2  /  Alb  3.7  /  TBili  0.5  /  DBili  x   /  AST  15  /  ALT  13  /  AlkPhos  92  05-31      proBNP:   Lipid Profile:   HgA1c:   TSH:       CARDIAC MARKERS:            Interpretation of Telemetry: 	    ECG:  	nsr lvh  RADIOLOGY:  OTHER: 	    PREVIOUS DIAGNOSTIC TESTING:    [ ] Echocardiogram:  [ ] Catheterization:  [ ] Stress Test:  	    Assessment:  syncope - likely vasovagal, r/o malignant etiology  uterine cancer  HLD  HTN  GERD    Recs:  cardiac stable  syncope likely vagal in setting of nausea and possible post-micturition. monitor on tele, check orthostatics, 2d echo, ep consult  consider GI eval for workup of nausea  dvt ppx  will follow  Advanced care planning forms were discussed. Code status including forceful chest compressions, defibrillation and intubation were discussed. The risks benefits and alternatives to pertinent cardiac procedures and interventions were discussed in detail and all questions were answered. Duration: 15 minutes.        Greater than 90 minutes spent on total encounter; more than 50% of the visit was spent counseling and/or coordinating care by the attending physician.   	  Sarbjit Caraballo MD   Cardiovascular Diseases  [phone]

## 2022-06-01 NOTE — H&P ADULT - HISTORY OF PRESENT ILLNESS
>>>>>>Medical Attending Initial / Admission note<<<<<<  -------------------------------------------------------------------------------  CHIEF COMPLAINT & HISTORY OF PRESENT ILLNESS:      Patient is an 85 yo woman with past medical history of  uterine cancer and ? bladder prolapse on recent 3 courses of abx for UTI, HLD, HTN, GERD BIBEMS from urology office for a syncope evaluation.   Patient was standing at desk in office and suddenly passed out, witnessed by her sister whom lowed her down to the floor to prevent her falling. Patient denies preceding sxs of CP, SOB, palpitations or dizziness. Denies symptoms after the episode. Endorsed she has had at least 3-4 similar episodes in past two weeks during which she had vomiting after the syncope.   Denies head trauma, seizure or hx of heart disease/ arrythmia. Pt. states she has been following up with her doctor for her syncope workup and was told it was orthostatic syncope.   pt also been following with  for ? bladder prolapse and was told to come here for evaluation for Sx by a doctor which she does not recall the name of .. !   she was treated with antibiotic for her UTI last week ( 3rd course per pt) but did not do anything..   pt admitted to tele and Cardio / EP following for workup     currently in ER otherwise feels ok , hungry     --------------------------------------------------------------------------------  PAST MEDICAL HISTORY:    Hypertension  Hyperlipidemia  Endometrial cancer  [place] (hard of hearing)  ? bladder prolapse     --------------------------------------------------------------------------------  PAST SURGICAL HISTORY:    Hysterectomy   Encounter for biopsy 4/2014 - endometrial biopsy  Ankle fracture left - s/p ORIF    --------------------------------------------------------------------------------  FAMILY HISTORY:    Mother and sister : breast cancer  father: heart disease    --------------------------------------------------------------------------------  SOCIAL HISTORY:  Alcohol: None reported  Smoking: remote history     --------------------------------------------------------------------------------  ALLERGIES:    [As bellow, reviewed]    --------------------------------------------------------------------------------  MEDICATIONS:   [As bellow, reviewed]    --------------------------------------------------------------------------------  REVIEW OF SYSTEM:    GEN: no fever, no chills, no pain  RESP: no SOB, no cough, no sputum  CVS: no chest pain, no palpitations, no edema  GI: no abdominal pain, no nausea, no vomiting, no constipation, no diarrhea  : occasional dysuria, no frequency, no hematuria  Neuro: no headache, no dizziness  PSYCH: no anxiety, no depression  Derm : no itching, no rash    --------------------------------------------------------------------------------  VITAL SIGNS:  Height (cm): 162.6  Vital Signs Last 24 HrsT(C): 37 (06-01-22 @ 08:30)  T(F): 98.6 (06-01-22 @ 08:30), Max: 98.6 (06-01-22 @ 08:30)  HR: 108 (06-01-22 @ 08:30) (87 - 113)  BP: 148/95 (06-01-22 @ 08:30)  RR: 17 (06-01-22 @ 08:30) (13 - 18)  SpO2: 98% (06-01-22 @ 08:30) (97% - 100%)      POCT Blood Glucose.: 108 mg/dL (31 May 2022 20:40)    --------------------------------------------------------------------------------  PHYSICAL EXAM:    GEN: A&O X 3 , NAD , comfortable, pleasant, calm, appears clinically dehydrated   HEENT: NCAT, PERRL, MMM, hard of hearing   Neck: supple , no JVD  CVS: S1S2 , regular , No M/R/G appreciated, tachy   PULM: CTA B/L,  no W/R/R appreciated  ABD.: soft. non tender, non distended,  bowel sounds present  Extrem: intact pulses , no edema   Derm: No rash , no ecchymoses  PSYCH : normal mood,  no delusion not anxious    --------------------------------------------------------------------------------  LAB AND IMAGING:   [As aaron, reviewed]    --------------------------------------------------------------------------------  ASSESSMENT AND PLAN:   [As bellow]    --------------------  Case discussed with NAOMI velasquez.   ___________________________  HCrissy Thomas D.O.  Pager: [phone]  06-01-22

## 2022-06-01 NOTE — H&P ADULT - PROBLEM SELECTOR PLAN 2
unclear etiology  recently treated for UTI as OP as above  U/A negative now  follow cultures already sent  IVH  monitor / repeat labs  hold off abx unless febrile / worsened  imaging of bladder . kidney  will need to find info on  pt was supposed to see .. but likely will need to follow up as  OP for bladder procedures..

## 2022-06-01 NOTE — ED ADULT NURSE REASSESSMENT NOTE - NS ED NURSE REASSESS COMMENT FT1
Received patient in room 2 c/o syncope today. Patient is A&OX3, on cardiac monitor NSR noted O2 sat 98% on a room air non labored breathing. Patient denies chest pain/SOB. Side rails up and safety maintained. Fall precaution in place. Pending lab results.
pt A&Ox4 ambulatory with steady gait to restroom. UA cup provided to obtain urine sample.
pt A&Ox3 (self, date and "hospital" states she is here for answers regarding her urinary problems. admitted to tele for syncope. 20G right AC, IVF infusing well. sinus tachycardiac on tele.
patient alert ox4 dernier any new complaints, VS as noted. admitted awaiting on bed.

## 2022-06-02 ENCOUNTER — TRANSCRIPTION ENCOUNTER (OUTPATIENT)
Age: 87
End: 2022-06-02

## 2022-06-02 ENCOUNTER — NON-APPOINTMENT (OUTPATIENT)
Age: 87
End: 2022-06-02

## 2022-06-02 VITALS
RESPIRATION RATE: 18 BRPM | OXYGEN SATURATION: 97 % | HEART RATE: 92 BPM | TEMPERATURE: 98 F | SYSTOLIC BLOOD PRESSURE: 148 MMHG | DIASTOLIC BLOOD PRESSURE: 71 MMHG

## 2022-06-02 LAB
A1C WITH ESTIMATED AVERAGE GLUCOSE RESULT: 6.1 % — HIGH (ref 4–5.6)
ALBUMIN SERPL ELPH-MCNC: 3.4 G/DL — SIGNIFICANT CHANGE UP (ref 3.3–5)
ALP SERPL-CCNC: 96 U/L — SIGNIFICANT CHANGE UP (ref 40–120)
ALT FLD-CCNC: 10 U/L — SIGNIFICANT CHANGE UP (ref 4–33)
ANION GAP SERPL CALC-SCNC: 13 MMOL/L — SIGNIFICANT CHANGE UP (ref 7–14)
AST SERPL-CCNC: 9 U/L — SIGNIFICANT CHANGE UP (ref 4–32)
BASOPHILS # BLD AUTO: 0.03 K/UL — SIGNIFICANT CHANGE UP (ref 0–0.2)
BASOPHILS NFR BLD AUTO: 0.3 % — SIGNIFICANT CHANGE UP (ref 0–2)
BILIRUB SERPL-MCNC: 0.5 MG/DL — SIGNIFICANT CHANGE UP (ref 0.2–1.2)
BUN SERPL-MCNC: 15 MG/DL — SIGNIFICANT CHANGE UP (ref 7–23)
CALCIUM SERPL-MCNC: 8.7 MG/DL — SIGNIFICANT CHANGE UP (ref 8.4–10.5)
CHLORIDE SERPL-SCNC: 99 MMOL/L — SIGNIFICANT CHANGE UP (ref 98–107)
CHOLEST SERPL-MCNC: 109 MG/DL — SIGNIFICANT CHANGE UP
CO2 SERPL-SCNC: 22 MMOL/L — SIGNIFICANT CHANGE UP (ref 22–31)
CREAT SERPL-MCNC: 0.54 MG/DL — SIGNIFICANT CHANGE UP (ref 0.5–1.3)
CULTURE RESULTS: SIGNIFICANT CHANGE UP
EGFR: 90 ML/MIN/1.73M2 — SIGNIFICANT CHANGE UP
EOSINOPHIL # BLD AUTO: 0.07 K/UL — SIGNIFICANT CHANGE UP (ref 0–0.5)
EOSINOPHIL NFR BLD AUTO: 0.7 % — SIGNIFICANT CHANGE UP (ref 0–6)
ESTIMATED AVERAGE GLUCOSE: 128 MG/DL — HIGH (ref 68–114)
GLUCOSE SERPL-MCNC: 129 MG/DL — HIGH (ref 70–99)
HCT VFR BLD CALC: 42.4 % — SIGNIFICANT CHANGE UP (ref 34.5–45)
HDLC SERPL-MCNC: 42 MG/DL — LOW
HGB BLD-MCNC: 14.3 G/DL — SIGNIFICANT CHANGE UP (ref 11.5–15.5)
IANC: 6.98 K/UL — SIGNIFICANT CHANGE UP (ref 1.8–7.4)
IMM GRANULOCYTES NFR BLD AUTO: 0.5 % — SIGNIFICANT CHANGE UP (ref 0–1.5)
LIPID PNL WITH DIRECT LDL SERPL: 48 MG/DL — SIGNIFICANT CHANGE UP
LYMPHOCYTES # BLD AUTO: 2.23 K/UL — SIGNIFICANT CHANGE UP (ref 1–3.3)
LYMPHOCYTES # BLD AUTO: 21.5 % — SIGNIFICANT CHANGE UP (ref 13–44)
MCHC RBC-ENTMCNC: 31 PG — SIGNIFICANT CHANGE UP (ref 27–34)
MCHC RBC-ENTMCNC: 33.7 GM/DL — SIGNIFICANT CHANGE UP (ref 32–36)
MCV RBC AUTO: 91.8 FL — SIGNIFICANT CHANGE UP (ref 80–100)
MONOCYTES # BLD AUTO: 1.01 K/UL — HIGH (ref 0–0.9)
MONOCYTES NFR BLD AUTO: 9.7 % — SIGNIFICANT CHANGE UP (ref 2–14)
NEUTROPHILS # BLD AUTO: 6.98 K/UL — SIGNIFICANT CHANGE UP (ref 1.8–7.4)
NEUTROPHILS NFR BLD AUTO: 67.3 % — SIGNIFICANT CHANGE UP (ref 43–77)
NON HDL CHOLESTEROL: 67 MG/DL — SIGNIFICANT CHANGE UP
NRBC # BLD: 0 /100 WBCS — SIGNIFICANT CHANGE UP
NRBC # FLD: 0 K/UL — SIGNIFICANT CHANGE UP
PLATELET # BLD AUTO: 251 K/UL — SIGNIFICANT CHANGE UP (ref 150–400)
POTASSIUM SERPL-MCNC: 3.4 MMOL/L — LOW (ref 3.5–5.3)
POTASSIUM SERPL-SCNC: 3.4 MMOL/L — LOW (ref 3.5–5.3)
PROT SERPL-MCNC: 6.4 G/DL — SIGNIFICANT CHANGE UP (ref 6–8.3)
RBC # BLD: 4.62 M/UL — SIGNIFICANT CHANGE UP (ref 3.8–5.2)
RBC # FLD: 12.4 % — SIGNIFICANT CHANGE UP (ref 10.3–14.5)
SODIUM SERPL-SCNC: 134 MMOL/L — LOW (ref 135–145)
SPECIMEN SOURCE: SIGNIFICANT CHANGE UP
TRIGL SERPL-MCNC: 97 MG/DL — SIGNIFICANT CHANGE UP
TSH SERPL-MCNC: 0.91 UIU/ML — SIGNIFICANT CHANGE UP (ref 0.27–4.2)
WBC # BLD: 10.37 K/UL — SIGNIFICANT CHANGE UP (ref 3.8–10.5)
WBC # FLD AUTO: 10.37 K/UL — SIGNIFICANT CHANGE UP (ref 3.8–10.5)

## 2022-06-02 RX ORDER — OMEPRAZOLE 10 MG/1
1 CAPSULE, DELAYED RELEASE ORAL
Qty: 0 | Refills: 0 | DISCHARGE

## 2022-06-02 RX ORDER — POTASSIUM CHLORIDE 20 MEQ
40 PACKET (EA) ORAL ONCE
Refills: 0 | Status: DISCONTINUED | OUTPATIENT
Start: 2022-06-02 | End: 2022-06-02

## 2022-06-02 RX ORDER — OMEGA-3 ACID ETHYL ESTERS 1 G
0 CAPSULE ORAL
Qty: 0 | Refills: 0 | DISCHARGE

## 2022-06-02 RX ADMIN — FAMOTIDINE 20 MILLIGRAM(S): 10 INJECTION INTRAVENOUS at 12:42

## 2022-06-02 RX ADMIN — LOSARTAN POTASSIUM 100 MILLIGRAM(S): 100 TABLET, FILM COATED ORAL at 05:19

## 2022-06-02 RX ADMIN — SODIUM CHLORIDE 75 MILLILITER(S): 9 INJECTION INTRAMUSCULAR; INTRAVENOUS; SUBCUTANEOUS at 05:20

## 2022-06-02 RX ADMIN — Medication 81 MILLIGRAM(S): at 13:15

## 2022-06-02 RX ADMIN — Medication 30 MILLIGRAM(S): at 05:19

## 2022-06-02 RX ADMIN — ENOXAPARIN SODIUM 40 MILLIGRAM(S): 100 INJECTION SUBCUTANEOUS at 12:43

## 2022-06-02 NOTE — DISCHARGE NOTE PROVIDER - CARE PROVIDER_API CALL
Riccardo Caraballo J  CARDIOVASCULAR DISEASE  [address] [place] [address]  Phone: [phone]  Fax: [phone]  Follow Up Time:

## 2022-06-02 NOTE — PHARMACOTHERAPY INTERVENTION NOTE - COMMENTS
Educated patient on medication indications and how and when to take the medications. Patient verbalized understanding.     Patient reports not taking acetaminophen-oxycodone as needed for pain. Removed from Outpatient Medication Review.    Eli Ramirez PharmD  PGY-1 Pharmacy Resident  Spectra: [phone]

## 2022-06-02 NOTE — DISCHARGE NOTE PROVIDER - HOSPITAL COURSE
85 yo woman with past medical history of  uterine cancer and ? bladder prolapse on recent 3 courses of abx for UTI, HLD, HTN, GERD BIBEMS from urology office for a syncope evaluation. S/p IVF for positive orthostatics. 87 yo woman with past medical history of  uterine cancer and ? bladder prolapse on recent 3 courses of abx for UTI, HLD, HTN, GERD BIBEMS from urology office for a syncope evaluation. S/p IVF for positive orthostatics. Cardiology following and syncope is likely secondary to vasovagal in setting of nausea. Patient is currently not nauseous now. EP following patient, offered ILR however patient refused. Physical therapy evaluated the patient and recommended home with no skilled PT needs.     Case discussed with  ____ on ___. Patient is medically stable and cleared for discharge. 87 yo woman with past medical history of  uterine cancer and ? bladder prolapse on recent 3 courses of abx for UTI, HLD, HTN, GERD BIBEMS from urology office for a syncope evaluation. S/p IVF for positive orthostatics. Cardiology following and syncope is likely secondary to vasovagal in setting of nausea. TTE showed grossly normal LV systolic function and grossly normal RV systolic function. US kidneys and bladder showed No hydronephrosis. Right renal cyst and left lower pole caliectasis.Patient is currently not nauseous now. EP following patient, offered ILR however patient refused. Physical therapy evaluated the patient and recommended home with no skilled PT needs.     Case discussed with Dr. Thomas on June 2, 2022. Patient is medically stable and cleared for discharge.

## 2022-06-02 NOTE — PROGRESS NOTE ADULT - SUBJECTIVE AND OBJECTIVE BOX
Subjective: States that she feels better and the is is urinating more now. Denies HA, lightheadedness, dizziness, CP, palpitation, SOB, abdominal pain, and N/V.      Interval events:  - BIBEMS to the ED after a witnessed syncopal event at the urology office where she was being evaluated for syncope. Of note, Pt. states she has been following up with her doctors for her syncope workup and was told it was orthostatic syncope. Admits she was treated with antibiotic for her UTI last week.   -EP is consulted for syncope which was likely 2/2  Vaso-vagal by history. Tele showed SR with APCs at 93Bpm, Tele recorded SR at 100s APCs and PVCs. Offered patient ILR but she declined.   MEDICATIONS  (STANDING):  aspirin enteric coated 81 milliGRAM(s) Oral daily  enoxaparin Injectable 40 milliGRAM(s) SubCutaneous every 24 hours  famotidine    Tablet 20 milliGRAM(s) Oral daily  losartan 100 milliGRAM(s) Oral daily  NIFEdipine XL 30 milliGRAM(s) Oral daily  potassium chloride    Tablet ER 40 milliEquivalent(s) Oral once  simvastatin 10 milliGRAM(s) Oral at bedtime  sodium chloride 0.9%. 1000 milliLiter(s) (75 mL/Hr) IV Continuous <Continuous>    MEDICATIONS  (PRN):  acetaminophen     Tablet .. 650 milliGRAM(s) Oral every 6 hours PRN Temp greater or equal to 38C (100.4F), Mild Pain (1 - 3)  aluminum hydroxide/magnesium hydroxide/simethicone Suspension 30 milliLiter(s) Oral every 4 hours PRN Dyspepsia  melatonin 3 milliGRAM(s) Oral at bedtime PRN Insomnia  ondansetron Injectable 4 milliGRAM(s) IV Push every 8 hours PRN Nausea and/or Vomiting      Vital Signs Last 24 Hrs  T(C): 36.7 (02 Jun 2022 05:09), Max: 37.2 (01 Jun 2022 18:59)  T(F): 98 (02 Jun 2022 05:09), Max: 98.9 (01 Jun 2022 18:59)  HR: 89 (02 Jun 2022 05:09) (89 - 109)  BP: 152/74 (02 Jun 2022 05:09) (151/76 - 155/88)  BP(mean): --  RR: 18 (02 Jun 2022 05:09) (18 - 18)  SpO2: 94% (02 Jun 2022 05:09) (94% - 98%)  I&O's Detail      Physical Exam:  GENERAL: Lying in bed, well appearing, speaking in full sentence, in NAD  HEART: S1S2 RRR  PULMONARY: CTABL, normal respiratory effort.  ABDOMEN: + Bowel sounds present, soft  EXTREMITIES:  Warm, well -perfused, no pedal edema, distal pulses present  NEUROLOGICAL:AOx3     TELEMETERIC: SR [id number] with PACS                            14.3   10.37 )-----------( 251      ( 02 Jun 2022 06:50 )             42.4       06-02    134<L>  |  99  |  15  ----------------------------<  129<H>  3.4<L>   |  22  |  0.54    Ca    8.7      02 Jun 2022 06:50  Phos  2.6     05-31  Mg     2.20     05-31    TPro  6.4  /  Alb  3.4  /  TBili  0.5  /  DBili  x   /  AST  9   /  ALT  10  /  AlkPhos  96  06-02        
Cardiovascular Disease Progress Note    Overnight events: No acute events overnight. no cp/sob/palps/dizziness   Otherwise review of systems negative    Objective Findings:  T(C): 36.7 (06-02-22 @ 05:09), Max: 37.2 (06-01-22 @ 18:59)  HR: 89 (06-02-22 @ 05:09) (89 - 109)  BP: 152/74 (06-02-22 @ 05:09) (148/95 - 155/88)  RR: 18 (06-02-22 @ 05:09) (17 - 18)  SpO2: 94% (06-02-22 @ 05:09) (94% - 98%)  Wt(kg): --  Daily     Daily       Physical Exam:  Gen: NAD  HEENT: EOMI  CV: RRR, normal S1 + S2, no m/r/g  Lungs: CTAB  Abd: soft, non-tender  Ext: No edema    Telemetry:    Laboratory Data:                        13.5   16.58 )-----------( 254      ( 31 May 2022 20:30 )             41.7     05-31    133<L>  |  96<L>  |  32<H>  ----------------------------<  120<H>  3.3<L>   |  23  |  1.13    Ca    9.4      31 May 2022 20:30  Phos  2.6     05-31  Mg     2.20     05-31    TPro  6.2  /  Alb  3.7  /  TBili  0.5  /  DBili  x   /  AST  15  /  ALT  13  /  AlkPhos  92  05-31              Inpatient Medications:  MEDICATIONS  (STANDING):  aspirin enteric coated 81 milliGRAM(s) Oral daily  enoxaparin Injectable 40 milliGRAM(s) SubCutaneous every 24 hours  famotidine    Tablet 20 milliGRAM(s) Oral daily  losartan 100 milliGRAM(s) Oral daily  NIFEdipine XL 30 milliGRAM(s) Oral daily  simvastatin 10 milliGRAM(s) Oral at bedtime  sodium chloride 0.9%. 1000 milliLiter(s) (75 mL/Hr) IV Continuous <Continuous>      Assessment:  syncope - likely vasovagal, r/o malignant etiology  uterine cancer  HLD  HTN  GERD    Recs:  cardiac stable  syncope likely vagal in setting of nausea and possible post-micturition. monitor on tele, check orthostatics prn sx, 2d echo wnl, ep consult appreciated  consider GI eval for workup of nausea  gu follow up  dvt ppx  will follow  no cardiac obj to dc        Over 25 minutes spent on total encounter; more than 50% of the visit was spent counseling and/or coordinating care by the attending physician.      Sarbjit Caraballo MD   Cardiovascular Disease  [phone]

## 2022-06-02 NOTE — DISCHARGE NOTE PROVIDER - NSDCCAREPROVSEEN_GEN_ALL_CORE_FT
Yogi Thomas Yogi Cuevas  User ADM  Sarbjit Caraballo  Team [facility] Medicine ACP  Team [facility] Cardiac Electrophysiolgy

## 2022-06-02 NOTE — DISCHARGE NOTE PROVIDER - NSDCCPCAREPLAN_GEN_ALL_CORE_FT
PRINCIPAL DISCHARGE DIAGNOSIS  Diagnosis: Syncope  Assessment and Plan of Treatment: You were to found to have a syncopable episode prior to coming to the hospital. It was likely due to a vasovagal response. Cardiology saw you in the admission and recommended IV fluids which you received. Electrophysiology was following you. You were offered a loop recorder by electrophysiology but you declined. Please follow up with your cardiologist and primary care doctor. If you feel dizzy, lightheaded at all, do not get up without asking for help. Get up slowly, sit up for a few minutes before standing.      SECONDARY DISCHARGE DIAGNOSES  Diagnosis: HTN (hypertension)  Assessment and Plan of Treatment: Low sodium and fat diet, continue anti-hypertensive medications, and follow up with primary care physician.    Diagnosis: Chronic GERD  Assessment and Plan of Treatment: Please follow up with your primary care doctor. Please continue to take your medications as ordered.

## 2022-06-02 NOTE — DISCHARGE NOTE NURSING/CASE MANAGEMENT/SOCIAL WORK - PATIENT PORTAL LINK FT
You can access the FollowMyHealth Patient Portal offered by [facility] by registering at the following website: [email or web address]. By joining [employer]’s FollowMyHealth portal, you will also be able to view your health information using other applications (apps) compatible with our system.

## 2022-06-02 NOTE — DISCHARGE NOTE NURSING/CASE MANAGEMENT/SOCIAL WORK - NSDCPEFALRISK_GEN_ALL_CORE
For information on Fall & Injury Prevention, visit: [email or web address] OR  [email or web address] OR  https://www.cdc.gov/steadi/patient.html

## 2022-06-02 NOTE — DISCHARGE NOTE PROVIDER - NSDCFUSCHEDAPPT_GEN_ALL_CORE_FT
[facility] CrenshawCommunity Health Physician [facility]  [address]  Scheduled Appointment: 07/07/2022

## 2022-06-02 NOTE — PROGRESS NOTE ADULT - ASSESSMENT
M E D I C A L   A T T E N D I N G    F O L L O W    U P   N O T E  (22 )                                     ------------------------------------------------------------------------------------------------    patient evaluated by me, case discussed with team, chart, medications, and physical exam reviewed, labs / tests  and vitals reviewed by me, as bellow.   Patient is stable for discharge today. pt overall much improved, good appetite, eating drinking, no nausea... no major events reported / noted otherwise ... discussed with pt re keeping well hydrated, etc. all questions answered   Patient to follow up with PMD, Cardio   See discharge document for full note.                             ( note written / Date of service  22 )    ==================>> MEDICATIONS <<====================    aspirin enteric coated 81 milliGRAM(s) Oral daily  enoxaparin Injectable 40 milliGRAM(s) SubCutaneous every 24 hours  famotidine    Tablet 20 milliGRAM(s) Oral daily  losartan 100 milliGRAM(s) Oral daily  NIFEdipine XL 30 milliGRAM(s) Oral daily  potassium chloride    Tablet ER 40 milliEquivalent(s) Oral once  simvastatin 10 milliGRAM(s) Oral at bedtime  sodium chloride 0.9%. 1000 milliLiter(s) IV Continuous <Continuous>    MEDICATIONS  (PRN):  acetaminophen     Tablet .. 650 milliGRAM(s) Oral every 6 hours PRN Temp greater or equal to 38C (100.4F), Mild Pain (1 - 3)  aluminum hydroxide/magnesium hydroxide/simethicone Suspension 30 milliLiter(s) Oral every 4 hours PRN Dyspepsia  melatonin 3 milliGRAM(s) Oral at bedtime PRN Insomnia  ondansetron Injectable 4 milliGRAM(s) IV Push every 8 hours PRN Nausea and/or Vomiting    ___________  Active diet:  Diet, Regular  ___________________    ==================>> VITAL SIGNS <<==================    T(C): 36.9 (22 @ 12:43), Max: 37.2 (22 @ 18:59)  HR: 89 (22 @ 05:09) (89 - 98)  BP: 152/74 (22 @ 05:09) (151/76 - 155/88)  BP(mean): --  RR: 18 (22 @ 12:43) (18 - 18)  SpO2: 97% (22 @ 12:43) (94% - 98%)        ==================>> LAB AND IMAGING <<==================                        14.3   10.37 )-----------( 251      ( 2022 06:50 )             42.4            134<L>  |  99  |  15  ----------------------------<  129<H>  3.4<L>   |  22  |  0.54    Ca    8.7      2022 06:50  Phos  2.6       Mg     2.20         TPro  6.4  /  Alb  3.4  /  TBili  0.5  /  DBili  x   /  AST  9   /  ALT  10  /  AlkPhos  96       Urinalysis Basic - ( 31 May 2022 22:40 )    Color: Yellow / Appearance: Clear / S.014 / pH: x  Gluc: x / Ketone: Negative  / Bili: Negative / Urobili: <2 mg/dL   Blood: x / Protein: 30 mg/dL / Nitrite: Negative   Leuk Esterase: Negative / RBC: 6 /HPF / WBC 2 /HPF   Sq Epi: x / Non Sq Epi: 3 /HPF / Bacteria: Negative    TSH:      0.91   (22)           Lipid profile:  (22)     Total: 109     LDL  : (p)     HDL  :42     TG   :97     HgA1C:   (22)          (22)      6.1  WBC count:   10.37 <<== ,  16.58 <<==   Hemoglobin:   14.3 <<==,  13.5 <<==  platelets:  251 <==, 254 <==    Creatinine:  0.54  <<==, 1.13  <<==  Sodium:   134  <==, 133  <==       AST:          9 <== , 15 <==      ALT:        10  <== , 13  <==      AP:        96  <=, 92  <=     Bili:        0.5  <=, 0.5  <=

## 2022-06-02 NOTE — DISCHARGE NOTE PROVIDER - NSDCMRMEDTOKEN_GEN_ALL_CORE_FT
acetaminophen-oxyCODONE 325 mg-5 mg oral tablet: 1 tab(s) orally every 4 hours, As needed, Moderate Pain  aspirin 81 mg oral tablet: 1 tab(s) orally once a day - last dose 4/28/14 then hold to OR  Calcium 600+D oral tablet: 1 tab(s) orally 2 times a day  Fish Oil:   ibuprofen 600 mg oral tablet: 1 tab(s) orally every 6 hours, As needed, mild pain  losartan 100 mg oral tablet: 1 tab(s) orally once a day  NIFEdipine 30 mg oral tablet, extended release: 1 tab(s) orally once a day  PriLOSEC 20 mg oral delayed release capsule: 1 cap(s) orally once a day  simvastatin 10 mg oral tablet: 1 tab(s) orally once a day (at bedtime)   aspirin 81 mg oral tablet: 1 tab(s) orally once a day - last dose 4/28/14 then hold to OR  Calcium 600+D oral tablet: 1 tab(s) orally 2 times a day  Fish Oil:   ibuprofen 600 mg oral tablet: 1 tab(s) orally every 6 hours, As needed, mild pain  losartan 100 mg oral tablet: 1 tab(s) orally once a day  NIFEdipine 30 mg oral tablet, extended release: 1 tab(s) orally once a day  PriLOSEC 20 mg oral delayed release capsule: 1 cap(s) orally once a day  simvastatin 10 mg oral tablet: 1 tab(s) orally once a day (at bedtime)   amoxicillin-clavulanate 875 mg-125 mg oral tablet: 1 tab(s) orally every 8 hours   aspirin 81 mg oral tablet: 1 tab(s) orally once a day - last dose 4/28/14 then hold to OR  Calcium 600+D oral tablet: 1 tab(s) orally 2 times a day  Fish Oil:   ibuprofen 600 mg oral tablet: 1 tab(s) orally every 6 hours, As needed, mild pain  losartan 100 mg oral tablet: 1 tab(s) orally once a day  NIFEdipine 30 mg oral tablet, extended release: 1 tab(s) orally once a day  PriLOSEC 20 mg oral delayed release capsule: 1 cap(s) orally once a day  simvastatin 10 mg oral tablet: 1 tab(s) orally once a day (at bedtime)

## 2022-06-02 NOTE — DISCHARGE NOTE NURSING/CASE MANAGEMENT/SOCIAL WORK - DATE OF FIRST COVID-19 BOOSTER
22-Apr-2022

## 2022-06-02 NOTE — DISCHARGE NOTE NURSING/CASE MANAGEMENT/SOCIAL WORK - NSDCFUADDAPPT_GEN_ALL_CORE_FT
Please follow up with Dr. Carl for followup appointment for bladder issues.     Please follow up with your PCP for further workup.

## 2022-06-02 NOTE — PHYSICAL THERAPY INITIAL EVALUATION ADULT - GENERAL OBSERVATIONS, REHAB EVAL
Patient received in semifowler position in bed in [facility]. Patient denies chest pain, SOB, headache, and dizziness.

## 2022-06-04 ENCOUNTER — EMERGENCY (EMERGENCY)
Facility: HOSPITAL | Age: 87
LOS: 1 days | Discharge: HOME | End: 2022-06-04
Attending: EMERGENCY MEDICINE | Admitting: EMERGENCY MEDICINE
Payer: MEDICARE

## 2022-06-04 VITALS
HEIGHT: 64 IN | RESPIRATION RATE: 20 BRPM | HEART RATE: 78 BPM | TEMPERATURE: 98 F | SYSTOLIC BLOOD PRESSURE: 127 MMHG | OXYGEN SATURATION: 100 % | DIASTOLIC BLOOD PRESSURE: 77 MMHG

## 2022-06-04 VITALS
HEART RATE: 102 BPM | DIASTOLIC BLOOD PRESSURE: 60 MMHG | OXYGEN SATURATION: 99 % | TEMPERATURE: 98 F | RESPIRATION RATE: 18 BRPM | SYSTOLIC BLOOD PRESSURE: 140 MMHG

## 2022-06-04 DIAGNOSIS — S82.899A OTHER FRACTURE OF UNSPECIFIED LOWER LEG, INITIAL ENCOUNTER FOR CLOSED FRACTURE: Chronic | ICD-10-CM

## 2022-06-04 DIAGNOSIS — Z01.818 ENCOUNTER FOR OTHER PREPROCEDURAL EXAMINATION: Chronic | ICD-10-CM

## 2022-06-04 LAB
ALBUMIN SERPL ELPH-MCNC: 3.8 G/DL — SIGNIFICANT CHANGE UP (ref 3.3–5)
ALP SERPL-CCNC: 101 U/L — SIGNIFICANT CHANGE UP (ref 40–120)
ALT FLD-CCNC: 14 U/L — SIGNIFICANT CHANGE UP (ref 4–33)
ANION GAP SERPL CALC-SCNC: 15 MMOL/L — HIGH (ref 7–14)
APPEARANCE UR: CLEAR — SIGNIFICANT CHANGE UP
AST SERPL-CCNC: 11 U/L — SIGNIFICANT CHANGE UP (ref 4–32)
BACTERIA # UR AUTO: NEGATIVE — SIGNIFICANT CHANGE UP
BASE EXCESS BLDV CALC-SCNC: 4.2 MMOL/L — HIGH (ref -2–3)
BASOPHILS # BLD AUTO: 0.03 K/UL — SIGNIFICANT CHANGE UP (ref 0–0.2)
BASOPHILS NFR BLD AUTO: 0.2 % — SIGNIFICANT CHANGE UP (ref 0–2)
BILIRUB SERPL-MCNC: 0.3 MG/DL — SIGNIFICANT CHANGE UP (ref 0.2–1.2)
BILIRUB UR-MCNC: NEGATIVE — SIGNIFICANT CHANGE UP
BLOOD GAS VENOUS COMPREHENSIVE RESULT: SIGNIFICANT CHANGE UP
BUN SERPL-MCNC: 20 MG/DL — SIGNIFICANT CHANGE UP (ref 7–23)
CALCIUM SERPL-MCNC: 9 MG/DL — SIGNIFICANT CHANGE UP (ref 8.4–10.5)
CHLORIDE BLDV-SCNC: 98 MMOL/L — SIGNIFICANT CHANGE UP (ref 96–108)
CHLORIDE SERPL-SCNC: 96 MMOL/L — LOW (ref 98–107)
CO2 BLDV-SCNC: 30.6 MMOL/L — HIGH (ref 22–26)
CO2 SERPL-SCNC: 25 MMOL/L — SIGNIFICANT CHANGE UP (ref 22–31)
COLOR SPEC: SIGNIFICANT CHANGE UP
CREAT SERPL-MCNC: 0.67 MG/DL — SIGNIFICANT CHANGE UP (ref 0.5–1.3)
DIFF PNL FLD: ABNORMAL
EGFR: 86 ML/MIN/1.73M2 — SIGNIFICANT CHANGE UP
EOSINOPHIL # BLD AUTO: 0 K/UL — SIGNIFICANT CHANGE UP (ref 0–0.5)
EOSINOPHIL NFR BLD AUTO: 0 % — SIGNIFICANT CHANGE UP (ref 0–6)
EPI CELLS # UR: 1 /HPF — SIGNIFICANT CHANGE UP (ref 0–5)
GAS PNL BLDV: 133 MMOL/L — LOW (ref 136–145)
GAS PNL BLDV: SIGNIFICANT CHANGE UP
GLUCOSE BLDV-MCNC: 162 MG/DL — HIGH (ref 70–99)
GLUCOSE SERPL-MCNC: 162 MG/DL — HIGH (ref 70–99)
GLUCOSE UR QL: ABNORMAL
HCO3 BLDV-SCNC: 29 MMOL/L — SIGNIFICANT CHANGE UP (ref 22–29)
HCT VFR BLD CALC: 43.3 % — SIGNIFICANT CHANGE UP (ref 34.5–45)
HCT VFR BLDA CALC: 44 % — SIGNIFICANT CHANGE UP (ref 34.5–46.5)
HGB BLD CALC-MCNC: 14.6 G/DL — SIGNIFICANT CHANGE UP (ref 11.5–15.5)
HGB BLD-MCNC: 14.7 G/DL — SIGNIFICANT CHANGE UP (ref 11.5–15.5)
IANC: 11.45 K/UL — HIGH (ref 1.8–7.4)
IMM GRANULOCYTES NFR BLD AUTO: 0.5 % — SIGNIFICANT CHANGE UP (ref 0–1.5)
KETONES UR-MCNC: ABNORMAL
LACTATE BLDV-MCNC: 1.8 MMOL/L — SIGNIFICANT CHANGE UP (ref 0.5–2)
LEUKOCYTE ESTERASE UR-ACNC: NEGATIVE — SIGNIFICANT CHANGE UP
LIDOCAIN IGE QN: 20 U/L — SIGNIFICANT CHANGE UP (ref 7–60)
LYMPHOCYTES # BLD AUTO: 1.45 K/UL — SIGNIFICANT CHANGE UP (ref 1–3.3)
LYMPHOCYTES # BLD AUTO: 10.5 % — LOW (ref 13–44)
MCHC RBC-ENTMCNC: 30.9 PG — SIGNIFICANT CHANGE UP (ref 27–34)
MCHC RBC-ENTMCNC: 33.9 GM/DL — SIGNIFICANT CHANGE UP (ref 32–36)
MCV RBC AUTO: 91.2 FL — SIGNIFICANT CHANGE UP (ref 80–100)
MONOCYTES # BLD AUTO: 0.75 K/UL — SIGNIFICANT CHANGE UP (ref 0–0.9)
MONOCYTES NFR BLD AUTO: 5.5 % — SIGNIFICANT CHANGE UP (ref 2–14)
NEUTROPHILS # BLD AUTO: 11.45 K/UL — HIGH (ref 1.8–7.4)
NEUTROPHILS NFR BLD AUTO: 83.3 % — HIGH (ref 43–77)
NITRITE UR-MCNC: NEGATIVE — SIGNIFICANT CHANGE UP
NRBC # BLD: 0 /100 WBCS — SIGNIFICANT CHANGE UP
NRBC # FLD: 0 K/UL — SIGNIFICANT CHANGE UP
PCO2 BLDV: 44 MMHG — HIGH (ref 39–42)
PH BLDV: 7.43 — SIGNIFICANT CHANGE UP (ref 7.32–7.43)
PH UR: 6.5 — SIGNIFICANT CHANGE UP (ref 5–8)
PLATELET # BLD AUTO: 276 K/UL — SIGNIFICANT CHANGE UP (ref 150–400)
PO2 BLDV: 30 MMHG — SIGNIFICANT CHANGE UP
POTASSIUM BLDV-SCNC: 3.2 MMOL/L — LOW (ref 3.5–5.1)
POTASSIUM SERPL-MCNC: 3.3 MMOL/L — LOW (ref 3.5–5.3)
POTASSIUM SERPL-SCNC: 3.3 MMOL/L — LOW (ref 3.5–5.3)
PROT SERPL-MCNC: 6.5 G/DL — SIGNIFICANT CHANGE UP (ref 6–8.3)
PROT UR-MCNC: NEGATIVE — SIGNIFICANT CHANGE UP
RBC # BLD: 4.75 M/UL — SIGNIFICANT CHANGE UP (ref 3.8–5.2)
RBC # FLD: 12.6 % — SIGNIFICANT CHANGE UP (ref 10.3–14.5)
RBC CASTS # UR COMP ASSIST: 3 /HPF — SIGNIFICANT CHANGE UP (ref 0–4)
SAO2 % BLDV: 47.3 % — SIGNIFICANT CHANGE UP
SARS-COV-2 RNA SPEC QL NAA+PROBE: SIGNIFICANT CHANGE UP
SODIUM SERPL-SCNC: 136 MMOL/L — SIGNIFICANT CHANGE UP (ref 135–145)
SP GR SPEC: 1.02 — SIGNIFICANT CHANGE UP (ref 1–1.05)
UROBILINOGEN FLD QL: SIGNIFICANT CHANGE UP
WBC # BLD: 13.75 K/UL — HIGH (ref 3.8–10.5)
WBC # FLD AUTO: 13.75 K/UL — HIGH (ref 3.8–10.5)
WBC UR QL: 1 /HPF — SIGNIFICANT CHANGE UP (ref 0–5)

## 2022-06-04 PROCEDURE — 74177 CT ABD & PELVIS W/CONTRAST: CPT | Mod: 26,MA

## 2022-06-04 PROCEDURE — 93010 ELECTROCARDIOGRAM REPORT: CPT

## 2022-06-04 PROCEDURE — 99285 EMERGENCY DEPT VISIT HI MDM: CPT | Mod: 25

## 2022-06-04 RX ORDER — ACETAMINOPHEN 500 MG
1000 TABLET ORAL ONCE
Refills: 0 | Status: COMPLETED | OUTPATIENT
Start: 2022-06-04 | End: 2022-06-04

## 2022-06-04 RX ORDER — METRONIDAZOLE 500 MG
500 TABLET ORAL ONCE
Refills: 0 | Status: DISCONTINUED | OUTPATIENT
Start: 2022-06-04 | End: 2022-06-04

## 2022-06-04 RX ORDER — POTASSIUM CHLORIDE 20 MEQ
40 PACKET (EA) ORAL ONCE
Refills: 0 | Status: COMPLETED | OUTPATIENT
Start: 2022-06-04 | End: 2022-06-04

## 2022-06-04 RX ORDER — METOCLOPRAMIDE HCL 10 MG
10 TABLET ORAL ONCE
Refills: 0 | Status: COMPLETED | OUTPATIENT
Start: 2022-06-04 | End: 2022-06-04

## 2022-06-04 RX ORDER — CIPROFLOXACIN LACTATE 400MG/40ML
500 VIAL (ML) INTRAVENOUS ONCE
Refills: 0 | Status: DISCONTINUED | OUTPATIENT
Start: 2022-06-04 | End: 2022-06-04

## 2022-06-04 RX ADMIN — Medication 40 MILLIEQUIVALENT(S): at 06:19

## 2022-06-04 RX ADMIN — Medication 10 MILLIGRAM(S): at 03:59

## 2022-06-04 RX ADMIN — Medication 1 TABLET(S): at 07:29

## 2022-06-04 RX ADMIN — Medication 400 MILLIGRAM(S): at 03:59

## 2022-06-04 NOTE — ED PROVIDER NOTE - PATIENT PORTAL LINK FT
You can access the FollowMyHealth Patient Portal offered by [facility] by registering at the following website: [email or web address]. By joining [employer]’s FollowMyHealth portal, you will also be able to view your health information using other applications (apps) compatible with our system.

## 2022-06-04 NOTE — ED ADULT TRIAGE NOTE - CHIEF COMPLAINT QUOTE
nausea, vomiting x a few hours - arrives with 20G in R arm given 6mg Zofran - awake, alert in triage - denies surgical hx

## 2022-06-04 NOTE — ED PROVIDER NOTE - OBJECTIVE STATEMENT
85 y/o F with h/o HTN, hyperlipidemia, GERD, endometrial ca here with nausea and vomiting.  Pt recently hospitalized for syncope episode that occurred while she was being evaluated at the urologist's office.  She was hospitalized and evaluated by EP - echo normal, pt declined ILR and discharged home on 6/2.  Pt reports yesterday she started to have nausea and again.  Reports a few episodes of nbnb vomiting and lower abd pain.  No fever, back pain, urinary sxs, diarrhea, chest pain, syncope.  She received IVFs and zofran by EMS PTA.

## 2022-06-04 NOTE — ED ADULT NURSE NOTE - OBJECTIVE STATEMENT
pt. received to room 22 A&Ox4 ambulatory at baseline p/w nausea. pt. endorses episodes of vomiting prior to arriving to the ED. pt. was recently d/c from the hospital for a syncopal episode.  pt. given 6mg of Zofran en route to the ED. pt. denies episodes of vomiting since arriving to the ED. denies changes in bowel habits at this time. denies blood in the vomit at this time. no acute distress noted. respirations even and unlabored. denies CP, HA, SOB, fever, chills. 20g IV placed in the R AC via EMS. MD Chavez at bedside. comfort measures provided. safety precautions maintained.

## 2022-06-04 NOTE — ED PROVIDER NOTE - NSFOLLOWUPINSTRUCTIONS_ED_ALL_ED_FT
You were seen in the emergency department with nausea and vomiting.  You had a CT scan that showed acute diverticulitis, which is a type of infection of your GI tract.  Your blood and urine tests were unconcerning.  You were prescribed an antibiotic - take augmentin 1 tab three times a day for 5 days.  Drink plenty of fluids.  You can take ibuprofen 600mg every 6 hours or Tylenol 650mg every 4 hours as needed for pain or fever.  Follow-up with your PMD in 24-48 hours.  Return to the emergency department for any new or worsening symptoms.

## 2022-06-04 NOTE — ED PROVIDER NOTE - NSICDXPASTMEDICALHX_GEN_ALL_CORE_FT
PAST MEDICAL HISTORY:  Endometrial cancer     [place] (hard of hearing) left > right - no hearing aids    Hyperlipidemia     Hypertension

## 2022-06-16 ENCOUNTER — RX CHANGE (OUTPATIENT)
Age: 87
End: 2022-06-16

## 2022-06-20 ENCOUNTER — RX CHANGE (OUTPATIENT)
Age: 87
End: 2022-06-20

## 2022-07-07 ENCOUNTER — APPOINTMENT (OUTPATIENT)
Dept: GYNECOLOGIC ONCOLOGY | Facility: CLINIC | Age: 87
End: 2022-07-07

## 2022-07-07 VITALS
DIASTOLIC BLOOD PRESSURE: 70 MMHG | SYSTOLIC BLOOD PRESSURE: 106 MMHG | HEART RATE: 81 BPM | WEIGHT: 134 LBS | HEIGHT: 62 IN | BODY MASS INDEX: 24.66 KG/M2

## 2022-07-07 PROCEDURE — 99213 OFFICE O/P EST LOW 20 MIN: CPT

## 2022-07-07 RX ORDER — NITROFURANTOIN (MONOHYDRATE/MACROCRYSTALS) 25; 75 MG/1; MG/1
100 CAPSULE ORAL
Qty: 14 | Refills: 0 | Status: COMPLETED | COMMUNITY
Start: 2022-05-15

## 2022-07-07 RX ORDER — AMOXICILLIN AND CLAVULANATE POTASSIUM 500; 125 MG/1; MG/1
500-125 TABLET, FILM COATED ORAL
Qty: 14 | Refills: 0 | Status: COMPLETED | COMMUNITY
Start: 2022-05-16

## 2022-07-07 RX ORDER — AMOXICILLIN AND CLAVULANATE POTASSIUM 875; 125 MG/1; MG/1
875-125 TABLET, COATED ORAL
Qty: 15 | Refills: 0 | Status: COMPLETED | COMMUNITY
Start: 2022-06-04

## 2022-11-11 ENCOUNTER — APPOINTMENT (OUTPATIENT)
Dept: GASTROENTEROLOGY | Facility: CLINIC | Age: 87
End: 2022-11-11

## 2022-11-23 ENCOUNTER — APPOINTMENT (OUTPATIENT)
Dept: GASTROENTEROLOGY | Facility: CLINIC | Age: 87
End: 2022-11-23

## 2023-01-04 ENCOUNTER — APPOINTMENT (OUTPATIENT)
Dept: PULMONOLOGY | Facility: CLINIC | Age: 88
End: 2023-01-04
Payer: MEDICARE

## 2023-01-04 VITALS
BODY MASS INDEX: 25.97 KG/M2 | SYSTOLIC BLOOD PRESSURE: 107 MMHG | HEART RATE: 79 BPM | OXYGEN SATURATION: 96 % | DIASTOLIC BLOOD PRESSURE: 79 MMHG | WEIGHT: 142 LBS

## 2023-01-04 DIAGNOSIS — J44.9 CHRONIC OBSTRUCTIVE PULMONARY DISEASE, UNSPECIFIED: ICD-10-CM

## 2023-01-04 DIAGNOSIS — R05.9 COUGH, UNSPECIFIED: ICD-10-CM

## 2023-01-04 DIAGNOSIS — R91.8 OTHER NONSPECIFIC ABNORMAL FINDING OF LUNG FIELD: ICD-10-CM

## 2023-01-04 PROCEDURE — 71046 X-RAY EXAM CHEST 2 VIEWS: CPT

## 2023-01-04 PROCEDURE — 99205 OFFICE O/P NEW HI 60 MIN: CPT | Mod: 25

## 2023-01-04 RX ORDER — NIFEDIPINE 30 MG/1
30 TABLET, EXTENDED RELEASE ORAL
Refills: 0 | Status: ACTIVE | COMMUNITY

## 2023-01-04 NOTE — ASSESSMENT
[FreeTextEntry1] : Increase Prilosec BID\par Prednisone course\par Course cefuroxime\par F/U 3 weeks sooner PRN.

## 2023-01-04 NOTE — CONSULT LETTER
[Dear  ___] : Dear ~NANDO, [Consult Letter:] : I had the pleasure of evaluating your patient, [unfilled]. [Consult Closing:] : Thank you very much for allowing me to participate in the care of this patient.  If you have any questions, please do not hesitate to contact me. [Sincerely,] : Sincerely, [FreeTextEntry2] : Riccardo Caraballo MD\par  [FreeTextEntry3] : Gerald Aldridge MD FCCP\par

## 2023-01-04 NOTE — HISTORY OF PRESENT ILLNESS
[Former] : former [TextBox_4] : EMILY PIMENTEL is a 87 year old  F referred for pulmonary evaluation for cough\par \par Had COVID around Thanksgiving. Had URI symptoms then cough. Cough persisted. \par Cough feels mucous but not mobilizing.\par No SOB\par No wheezing. \par No CP.\par Takes daily Prilosec controls GERD. Takes in AM\par Denies upper airway congestion or PN Drip.\par No recent CXR.\par \par \par Past pulmonary history. N\par Occupational Exposure. N\par Family history of pulmonary disease. N\par Recent travel [place]\par Pets N\par \par  [TextBox_11] : 1 [TextBox_13] : 50 [YearQuit] : 2007

## 2023-01-04 NOTE — PHYSICAL EXAM
[No Acute Distress] : no acute distress [Normal Oropharynx] : normal oropharynx [Normal Appearance] : normal appearance [No Neck Mass] : no neck mass [Normal Rate/Rhythm] : normal rate/rhythm [Normal S1, S2] : normal s1, s2 [No Murmurs] : no murmurs [No Resp Distress] : no resp distress [Clear to Auscultation Bilaterally] : clear to auscultation bilaterally [Normal to Percussion] : normal to percussion [No Abnormalities] : no abnormalities [Benign] : benign [No HSM] : no hsm [Normal Gait] : normal gait [No Clubbing] : no clubbing [No Cyanosis] : no cyanosis [No Edema] : no edema [FROM] : FROM [Normal Color/ Pigmentation] : normal color/ pigmentation [No Focal Deficits] : no focal deficits [Oriented x3] : oriented x3 [Normal Affect] : normal affect [TextBox_68] : No active wheezing.

## 2023-01-04 NOTE — DISCUSSION/SUMMARY
[FreeTextEntry1] : COPD\par Cough most likely related to a postinfectious inflammatory or eosinophilic bronchitis or component related to gastroesophageal reflux disease.  Rule out mild exacerbation of COPD.\par Follow-up\par Significant exposure to sister who haemophilus influenza.\par Pulmonary nodules.\par Radiographic abnormality likely old related to atelectasis and scarring.

## 2023-01-04 NOTE — PROCEDURE
[FreeTextEntry1] : CAT scan of the chest of Aileen 15, 2022 reviewed and reviewed.\par Of note small pulmonary nodules including groundglass nodule.\par Hiatal hernia.\par Emphysematous changes.\par Atelectasis scarring of right middle lobe and lingula.\par \par Attempted but could not perform spirometry.

## 2023-01-19 ENCOUNTER — NON-APPOINTMENT (OUTPATIENT)
Age: 88
End: 2023-01-19

## 2023-01-24 ENCOUNTER — APPOINTMENT (OUTPATIENT)
Dept: UROLOGY | Facility: CLINIC | Age: 88
End: 2023-01-24
Payer: MEDICARE

## 2023-01-24 VITALS
TEMPERATURE: 97.3 F | BODY MASS INDEX: 26.13 KG/M2 | WEIGHT: 142 LBS | OXYGEN SATURATION: 97 % | HEIGHT: 62 IN | SYSTOLIC BLOOD PRESSURE: 121 MMHG | HEART RATE: 81 BPM | DIASTOLIC BLOOD PRESSURE: 76 MMHG

## 2023-01-24 DIAGNOSIS — R35.0 FREQUENCY OF MICTURITION: ICD-10-CM

## 2023-01-24 DIAGNOSIS — N39.0 URINARY TRACT INFECTION, SITE NOT SPECIFIED: ICD-10-CM

## 2023-01-24 PROCEDURE — 99214 OFFICE O/P EST MOD 30 MIN: CPT

## 2023-01-24 NOTE — HISTORY OF PRESENT ILLNESS
[FreeTextEntry1] : Very pleasant 87-year-old woman who presents for follow-up of urinary urgency and recent urinary tract infection.  She reports to urinary tract infections of the last 6 months.  This is bothersome to her.  She reports a recent urine culture demonstrated bacteria.  She was started on Augmentin, however he reports that this did not improve her symptoms.

## 2023-01-24 NOTE — ASSESSMENT
[FreeTextEntry1] : Very pleasant 87-year-old woman who presents for follow-up of urinary urgency, recurrent urinary tract infections\par -Urinalysis\par -Urine culture\par -Start Cipro given concern for urinary tract affection at this time\par -BMP\par -We discussed general preventative strategies for urinary tract infections\par -Start cranberry supplements\par -We discussed option to start Hip-[place] to prevent urinary tract infections, however we will defer this at this time given only 2 recent urinary tract infections\par -Follow-up in 1 month

## 2023-01-25 ENCOUNTER — APPOINTMENT (OUTPATIENT)
Dept: PULMONOLOGY | Facility: CLINIC | Age: 88
End: 2023-01-25

## 2023-01-25 LAB
ANION GAP SERPL CALC-SCNC: 13 MMOL/L
APPEARANCE: ABNORMAL
BACTERIA: ABNORMAL
BILIRUBIN URINE: NEGATIVE
BLOOD URINE: NEGATIVE
BUN SERPL-MCNC: 17 MG/DL
CALCIUM SERPL-MCNC: 10.1 MG/DL
CHLORIDE SERPL-SCNC: 101 MMOL/L
CO2 SERPL-SCNC: 27 MMOL/L
COLOR: YELLOW
CREAT SERPL-MCNC: 0.75 MG/DL
EGFR: 77 ML/MIN/1.73M2
GLUCOSE QUALITATIVE U: NEGATIVE
GLUCOSE SERPL-MCNC: 101 MG/DL
HYALINE CASTS: 1 /LPF
KETONES URINE: NEGATIVE
LEUKOCYTE ESTERASE URINE: NEGATIVE
MICROSCOPIC-UA: NORMAL
NITRITE URINE: NEGATIVE
PH URINE: 6.5
POTASSIUM SERPL-SCNC: 5.3 MMOL/L
PROTEIN URINE: NORMAL
RED BLOOD CELLS URINE: 2 /HPF
SODIUM SERPL-SCNC: 141 MMOL/L
SPECIFIC GRAVITY URINE: 1.02
SQUAMOUS EPITHELIAL CELLS: 12 /HPF
UROBILINOGEN URINE: NORMAL
WHITE BLOOD CELLS URINE: 1 /HPF

## 2023-01-30 LAB — BACTERIA UR CULT: ABNORMAL

## 2023-02-01 ENCOUNTER — APPOINTMENT (OUTPATIENT)
Dept: UROLOGY | Facility: CLINIC | Age: 88
End: 2023-02-01

## 2023-03-01 ENCOUNTER — APPOINTMENT (OUTPATIENT)
Dept: UROLOGY | Facility: CLINIC | Age: 88
End: 2023-03-01

## 2023-04-26 ENCOUNTER — APPOINTMENT (OUTPATIENT)
Dept: GASTROENTEROLOGY | Facility: CLINIC | Age: 88
End: 2023-04-26

## 2023-07-13 ENCOUNTER — APPOINTMENT (OUTPATIENT)
Dept: GYNECOLOGIC ONCOLOGY | Facility: CLINIC | Age: 88
End: 2023-07-13
Payer: MEDICARE

## 2023-07-13 VITALS
HEART RATE: 80 BPM | HEIGHT: 62 IN | WEIGHT: 135 LBS | SYSTOLIC BLOOD PRESSURE: 131 MMHG | BODY MASS INDEX: 24.84 KG/M2 | DIASTOLIC BLOOD PRESSURE: 78 MMHG

## 2023-07-13 DIAGNOSIS — C54.1 MALIGNANT NEOPLASM OF ENDOMETRIUM: ICD-10-CM

## 2023-07-13 PROCEDURE — 99213 OFFICE O/P EST LOW 20 MIN: CPT

## 2023-07-13 RX ORDER — TROSPIUM CHLORIDE 60 MG/1
60 CAPSULE, EXTENDED RELEASE ORAL
Qty: 30 | Refills: 1 | Status: DISCONTINUED | COMMUNITY
Start: 2022-05-24 | End: 2023-07-13

## 2023-07-13 RX ORDER — CEFUROXIME AXETIL 250 MG/1
250 TABLET ORAL
Qty: 14 | Refills: 0 | Status: DISCONTINUED | COMMUNITY
Start: 2023-01-04 | End: 2023-07-13

## 2023-07-13 RX ORDER — CIPROFLOXACIN HYDROCHLORIDE 500 MG/1
500 TABLET, FILM COATED ORAL TWICE DAILY
Qty: 10 | Refills: 0 | Status: DISCONTINUED | COMMUNITY
Start: 2023-01-24 | End: 2023-07-13

## 2023-07-13 RX ORDER — AZELASTINE HYDROCHLORIDE 137 UG/1
0.1 SPRAY, METERED NASAL
Qty: 30 | Refills: 0 | Status: DISCONTINUED | COMMUNITY
Start: 2018-01-10 | End: 2023-07-13

## 2023-07-13 RX ORDER — PREDNISONE 10 MG/1
10 TABLET ORAL
Qty: 18 | Refills: 0 | Status: DISCONTINUED | COMMUNITY
Start: 2023-01-04 | End: 2023-07-13

## 2023-07-13 NOTE — LETTER BODY
[Dear  ___] : Dear  [unfilled], [I recently saw our patient [unfilled] for a follow-up visit.] : I recently saw our patient, [unfilled] for a follow-up visit. [Attached please find my note.] : Attached please find my note. [FreeTextEntry2] : She is clinically without evidence of disease at this time. She was advised to see me in 12 months for followup and to continue to see you for health maintenance as well. I will keep you updated on her status. Thank you again for referring this deni patient.

## 2023-07-13 NOTE — HISTORY OF PRESENT ILLNESS
[FreeTextEntry1] : Ms. Garrison has a h/o stage IA FIGO grade 3 endometrial carcinoma and is s/p RATLH/BSO/PLND on 14. She received adjuvant vaginal brachytherapy with Dr. Boston and has been discharged. She presents today for a surveillance visit. \par \par She is feeling well and denies VB or other associated signs and symptoms. She no longer uses dilator. \par Is followed by pulmonologist. \par \par HM:\par Pap: 5/20/15 negative; vaginal biopsy benign 2/18/15; now n/a\par Mammo:  negative per pt (breast health per Dr. Barton)\par Colon Cancer Screenin (-) per patient\par \par Referring GYN: Dr. Mariann Barton\par Radiation oncologist: Dr. Berkley Boston\par GI: doesn't recall\par PCP: Dr. Riccardo Prince\par Urologist: Dr. Sahu

## 2023-07-13 NOTE — PHYSICAL EXAM
[Absent] : Adnexa(ae): Absent [Normal] : Recto-Vaginal Exam: Normal [Fully active, able to carry on all pre-disease performance without restriction] : Status 0 - Fully active, able to carry on all pre-disease performance without restriction [Chaperone Present] : A chaperone was present in the examining room during all aspects of the physical examination [de-identified] : well-healed [de-identified] : no lesions or nodules.  [de-identified] : cystocele again noted. well-healed cuff. no lesions. [de-identified] : no mass or tenderness [de-identified] : normal sphincter tone, smooth rectovaginal septum, no cul-de-sac nodules.

## 2023-08-26 NOTE — ED PROVIDER NOTE - IV ALTEPLASE INCLUSION HIDDEN
Continued Stay Note   Mark     Patient Name: Charlie Wells  MRN: [id number]  Today's Date: 8/26/2023    Admit Date: 8/26/2023        Discharge Plan       Row Name 08/26/23 1517       Plan    Plan Comments Received call from pt nurse, he is without transportation. Arranged via Medicaid transport through [employer]. Trip number [id number]. May take up to 3 hours for pick-up per Verida.                            Tiffanie Avilez RN     show

## 2023-10-28 ENCOUNTER — INPATIENT (INPATIENT)
Facility: HOSPITAL | Age: 88
LOS: 3 days | Discharge: HOME | End: 2023-11-01
Attending: GENERAL PRACTICE | Admitting: GENERAL PRACTICE
Payer: MEDICARE

## 2023-10-28 VITALS
OXYGEN SATURATION: 100 % | RESPIRATION RATE: 18 BRPM | DIASTOLIC BLOOD PRESSURE: 67 MMHG | TEMPERATURE: 98 F | HEART RATE: 85 BPM | SYSTOLIC BLOOD PRESSURE: 131 MMHG

## 2023-10-28 DIAGNOSIS — S82.899A OTHER FRACTURE OF UNSPECIFIED LOWER LEG, INITIAL ENCOUNTER FOR CLOSED FRACTURE: Chronic | ICD-10-CM

## 2023-10-28 DIAGNOSIS — Z01.818 ENCOUNTER FOR OTHER PREPROCEDURAL EXAMINATION: Chronic | ICD-10-CM

## 2023-10-28 DIAGNOSIS — R55 SYNCOPE AND COLLAPSE: ICD-10-CM

## 2023-10-28 LAB
ALBUMIN SERPL ELPH-MCNC: 4.2 G/DL — SIGNIFICANT CHANGE UP (ref 3.3–5)
ALBUMIN SERPL ELPH-MCNC: 4.2 G/DL — SIGNIFICANT CHANGE UP (ref 3.3–5)
ALP SERPL-CCNC: 76 U/L — SIGNIFICANT CHANGE UP (ref 40–120)
ALP SERPL-CCNC: 76 U/L — SIGNIFICANT CHANGE UP (ref 40–120)
ALT FLD-CCNC: 10 U/L — SIGNIFICANT CHANGE UP (ref 4–33)
ALT FLD-CCNC: 10 U/L — SIGNIFICANT CHANGE UP (ref 4–33)
ANION GAP SERPL CALC-SCNC: 13 MMOL/L — SIGNIFICANT CHANGE UP (ref 7–14)
ANION GAP SERPL CALC-SCNC: 13 MMOL/L — SIGNIFICANT CHANGE UP (ref 7–14)
AST SERPL-CCNC: 14 U/L — SIGNIFICANT CHANGE UP (ref 4–32)
AST SERPL-CCNC: 14 U/L — SIGNIFICANT CHANGE UP (ref 4–32)
BASOPHILS # BLD AUTO: 0.02 K/UL — SIGNIFICANT CHANGE UP (ref 0–0.2)
BASOPHILS # BLD AUTO: 0.02 K/UL — SIGNIFICANT CHANGE UP (ref 0–0.2)
BASOPHILS NFR BLD AUTO: 0.2 % — SIGNIFICANT CHANGE UP (ref 0–2)
BASOPHILS NFR BLD AUTO: 0.2 % — SIGNIFICANT CHANGE UP (ref 0–2)
BILIRUB SERPL-MCNC: 0.5 MG/DL — SIGNIFICANT CHANGE UP (ref 0.2–1.2)
BILIRUB SERPL-MCNC: 0.5 MG/DL — SIGNIFICANT CHANGE UP (ref 0.2–1.2)
BUN SERPL-MCNC: 16 MG/DL — SIGNIFICANT CHANGE UP (ref 7–23)
BUN SERPL-MCNC: 16 MG/DL — SIGNIFICANT CHANGE UP (ref 7–23)
CALCIUM SERPL-MCNC: 9.9 MG/DL — SIGNIFICANT CHANGE UP (ref 8.4–10.5)
CALCIUM SERPL-MCNC: 9.9 MG/DL — SIGNIFICANT CHANGE UP (ref 8.4–10.5)
CHLORIDE SERPL-SCNC: 101 MMOL/L — SIGNIFICANT CHANGE UP (ref 98–107)
CHLORIDE SERPL-SCNC: 101 MMOL/L — SIGNIFICANT CHANGE UP (ref 98–107)
CO2 SERPL-SCNC: 23 MMOL/L — SIGNIFICANT CHANGE UP (ref 22–31)
CO2 SERPL-SCNC: 23 MMOL/L — SIGNIFICANT CHANGE UP (ref 22–31)
CREAT SERPL-MCNC: 0.65 MG/DL — SIGNIFICANT CHANGE UP (ref 0.5–1.3)
CREAT SERPL-MCNC: 0.65 MG/DL — SIGNIFICANT CHANGE UP (ref 0.5–1.3)
EGFR: 85 ML/MIN/1.73M2 — SIGNIFICANT CHANGE UP
EGFR: 85 ML/MIN/1.73M2 — SIGNIFICANT CHANGE UP
EOSINOPHIL # BLD AUTO: 0 K/UL — SIGNIFICANT CHANGE UP (ref 0–0.5)
EOSINOPHIL # BLD AUTO: 0 K/UL — SIGNIFICANT CHANGE UP (ref 0–0.5)
EOSINOPHIL NFR BLD AUTO: 0 % — SIGNIFICANT CHANGE UP (ref 0–6)
EOSINOPHIL NFR BLD AUTO: 0 % — SIGNIFICANT CHANGE UP (ref 0–6)
FLUAV AG NPH QL: SIGNIFICANT CHANGE UP
FLUAV AG NPH QL: SIGNIFICANT CHANGE UP
FLUBV AG NPH QL: SIGNIFICANT CHANGE UP
FLUBV AG NPH QL: SIGNIFICANT CHANGE UP
GLUCOSE SERPL-MCNC: 120 MG/DL — HIGH (ref 70–99)
GLUCOSE SERPL-MCNC: 120 MG/DL — HIGH (ref 70–99)
HCT VFR BLD CALC: 42.4 % — SIGNIFICANT CHANGE UP (ref 34.5–45)
HCT VFR BLD CALC: 42.4 % — SIGNIFICANT CHANGE UP (ref 34.5–45)
HGB BLD-MCNC: 14 G/DL — SIGNIFICANT CHANGE UP (ref 11.5–15.5)
HGB BLD-MCNC: 14 G/DL — SIGNIFICANT CHANGE UP (ref 11.5–15.5)
IANC: 6.19 K/UL — SIGNIFICANT CHANGE UP (ref 1.8–7.4)
IANC: 6.19 K/UL — SIGNIFICANT CHANGE UP (ref 1.8–7.4)
IMM GRANULOCYTES NFR BLD AUTO: 0.2 % — SIGNIFICANT CHANGE UP (ref 0–0.9)
IMM GRANULOCYTES NFR BLD AUTO: 0.2 % — SIGNIFICANT CHANGE UP (ref 0–0.9)
LYMPHOCYTES # BLD AUTO: 1.14 K/UL — SIGNIFICANT CHANGE UP (ref 1–3.3)
LYMPHOCYTES # BLD AUTO: 1.14 K/UL — SIGNIFICANT CHANGE UP (ref 1–3.3)
LYMPHOCYTES # BLD AUTO: 13.6 % — SIGNIFICANT CHANGE UP (ref 13–44)
LYMPHOCYTES # BLD AUTO: 13.6 % — SIGNIFICANT CHANGE UP (ref 13–44)
MCHC RBC-ENTMCNC: 31 PG — SIGNIFICANT CHANGE UP (ref 27–34)
MCHC RBC-ENTMCNC: 31 PG — SIGNIFICANT CHANGE UP (ref 27–34)
MCHC RBC-ENTMCNC: 33 GM/DL — SIGNIFICANT CHANGE UP (ref 32–36)
MCHC RBC-ENTMCNC: 33 GM/DL — SIGNIFICANT CHANGE UP (ref 32–36)
MCV RBC AUTO: 94 FL — SIGNIFICANT CHANGE UP (ref 80–100)
MCV RBC AUTO: 94 FL — SIGNIFICANT CHANGE UP (ref 80–100)
MONOCYTES # BLD AUTO: 1.04 K/UL — HIGH (ref 0–0.9)
MONOCYTES # BLD AUTO: 1.04 K/UL — HIGH (ref 0–0.9)
MONOCYTES NFR BLD AUTO: 12.4 % — SIGNIFICANT CHANGE UP (ref 2–14)
MONOCYTES NFR BLD AUTO: 12.4 % — SIGNIFICANT CHANGE UP (ref 2–14)
NEUTROPHILS # BLD AUTO: 6.19 K/UL — SIGNIFICANT CHANGE UP (ref 1.8–7.4)
NEUTROPHILS # BLD AUTO: 6.19 K/UL — SIGNIFICANT CHANGE UP (ref 1.8–7.4)
NEUTROPHILS NFR BLD AUTO: 73.6 % — SIGNIFICANT CHANGE UP (ref 43–77)
NEUTROPHILS NFR BLD AUTO: 73.6 % — SIGNIFICANT CHANGE UP (ref 43–77)
NRBC # BLD: 0 /100 WBCS — SIGNIFICANT CHANGE UP (ref 0–0)
NRBC # BLD: 0 /100 WBCS — SIGNIFICANT CHANGE UP (ref 0–0)
NRBC # FLD: 0 K/UL — SIGNIFICANT CHANGE UP (ref 0–0)
NRBC # FLD: 0 K/UL — SIGNIFICANT CHANGE UP (ref 0–0)
PLATELET # BLD AUTO: 193 K/UL — SIGNIFICANT CHANGE UP (ref 150–400)
PLATELET # BLD AUTO: 193 K/UL — SIGNIFICANT CHANGE UP (ref 150–400)
POTASSIUM SERPL-MCNC: 4.3 MMOL/L — SIGNIFICANT CHANGE UP (ref 3.5–5.3)
POTASSIUM SERPL-MCNC: 4.3 MMOL/L — SIGNIFICANT CHANGE UP (ref 3.5–5.3)
POTASSIUM SERPL-SCNC: 4.3 MMOL/L — SIGNIFICANT CHANGE UP (ref 3.5–5.3)
POTASSIUM SERPL-SCNC: 4.3 MMOL/L — SIGNIFICANT CHANGE UP (ref 3.5–5.3)
PROT SERPL-MCNC: 7.2 G/DL — SIGNIFICANT CHANGE UP (ref 6–8.3)
PROT SERPL-MCNC: 7.2 G/DL — SIGNIFICANT CHANGE UP (ref 6–8.3)
RBC # BLD: 4.51 M/UL — SIGNIFICANT CHANGE UP (ref 3.8–5.2)
RBC # BLD: 4.51 M/UL — SIGNIFICANT CHANGE UP (ref 3.8–5.2)
RBC # FLD: 12.8 % — SIGNIFICANT CHANGE UP (ref 10.3–14.5)
RBC # FLD: 12.8 % — SIGNIFICANT CHANGE UP (ref 10.3–14.5)
RSV RNA NPH QL NAA+NON-PROBE: SIGNIFICANT CHANGE UP
RSV RNA NPH QL NAA+NON-PROBE: SIGNIFICANT CHANGE UP
SARS-COV-2 RNA SPEC QL NAA+PROBE: DETECTED
SARS-COV-2 RNA SPEC QL NAA+PROBE: DETECTED
SODIUM SERPL-SCNC: 137 MMOL/L — SIGNIFICANT CHANGE UP (ref 135–145)
SODIUM SERPL-SCNC: 137 MMOL/L — SIGNIFICANT CHANGE UP (ref 135–145)
TROPONIN T, HIGH SENSITIVITY RESULT: 11 NG/L — SIGNIFICANT CHANGE UP
TROPONIN T, HIGH SENSITIVITY RESULT: 11 NG/L — SIGNIFICANT CHANGE UP
TROPONIN T, HIGH SENSITIVITY RESULT: 12 NG/L — SIGNIFICANT CHANGE UP
TROPONIN T, HIGH SENSITIVITY RESULT: 12 NG/L — SIGNIFICANT CHANGE UP
WBC # BLD: 8.41 K/UL — SIGNIFICANT CHANGE UP (ref 3.8–10.5)
WBC # BLD: 8.41 K/UL — SIGNIFICANT CHANGE UP (ref 3.8–10.5)
WBC # FLD AUTO: 8.41 K/UL — SIGNIFICANT CHANGE UP (ref 3.8–10.5)
WBC # FLD AUTO: 8.41 K/UL — SIGNIFICANT CHANGE UP (ref 3.8–10.5)

## 2023-10-28 PROCEDURE — 71045 X-RAY EXAM CHEST 1 VIEW: CPT | Mod: 26

## 2023-10-28 PROCEDURE — 99285 EMERGENCY DEPT VISIT HI MDM: CPT

## 2023-10-28 NOTE — ED ADULT NURSE NOTE - OBJECTIVE STATEMENT
Break RN- Patient received in stretcher. AOX4. Respirations even and unlabored. Spontaneous movement of all extremities noted. Presents to ER c/o " I passed out and my sister was worried" As per patient she was with her sister when her sister witnessed her pass out + LOC denies hitting head o use of blood thinners. Patient then went to [facility] [place] [facility] and was found to have COVID. Patient reports stuffy nose but denies any other s/s. IV placed. Labs drawn. Comfort and safety maintained. All current care needs met. Care plan continued Stevo BLANK

## 2023-10-28 NOTE — ED ADULT NURSE REASSESSMENT NOTE - NS ED NURSE REASSESS COMMENT FT1
Report received from day shift RN. Patient resting comfortably, offers no complaints. VS as documented. Safety maintained, call bell within reach.

## 2023-10-28 NOTE — ED PROVIDER NOTE - NSICDXPASTMEDICALHX_GEN_ALL_CORE_FT
PAST MEDICAL HISTORY:  Endometrial cancer     [place] (hard of hearing) left > right - no hearing aids    Hyperlipidemia     Hypertension

## 2023-10-28 NOTE — ED PROVIDER NOTE - PHYSICAL EXAMINATION
Thierno Story DO (PGY2)   Physical Exam:    Gen: NAD, AOx3  Head: NCAT  HEENT: EOMI, PEERLA  Lung: CTAB, no respiratory distress, no wheezes/rhonchi/rales B/L  CV: RRR, no murmurs, rubs or gallops  Abd: soft, NT, ND, no guarding, no rigidity, no rebound tenderness, no CVA tenderness   MSK: no visible deformities, ROM normal in UE/LE, no back pain  Neuro: No focal sensory or motor deficits. Sensation intact to light touch all extremities.  Skin: Warm, well perfused, no rash, no leg swelling

## 2023-10-28 NOTE — ED PROVIDER NOTE - ATTENDING CONTRIBUTION TO CARE
5 Dr. Brown, Attending Physician-  I performed a face to face bedside interview with patient regarding history of present illness, review of symptoms and past medical history. I completed an independent physical exam.  I have discussed patient's plan of care with the resident.    88F, HTN, HLD ,GERD, who presents arfter a syncopal episode. Has been dealing with nasal congestion and generalized weakness. Early this morning, was walking and then began to feel lightheaded. Sat on the cough and then lost consciousness. Decreased PO intake today. Cardiologist is MD Caraballo. Went to urgent care this morning and tested positive for COVID. No chest pain, sob, belly pain, nvd, dysuria, hematuria. Physical: Afebrile, well appearing, neck supple, no rash, rrr, ctabl, abdomen soft and ndnt, no le edema, stable gait. Plan: labs, CXR, EKG. MD Caraballo requesting admission.

## 2023-10-28 NOTE — ED PROVIDER NOTE - CLINICAL SUMMARY MEDICAL DECISION MAKING FREE TEXT BOX
87 y/o F with h/o HTN, hyperlipidemia, GERD, endometrial ca here with syncopal episode At home.  Patient states she was walking and felt lightheaded causing her to sit down on her couch and rested her head however unsure how long she was unconscious for.  States she got up after and was fine.  States that she went to urgent care for evaluation but she has had nasal congestion was found positive for COVID.  Endorsing some decreased p.o. intake today secondary to her symptoms.  She denies any dizziness, lightheadedness or headache at this time.  Vital signs stable, afebrile, not hypoxic. Plan for basic labs, trop, ekg, cxr, admission for syncope workup

## 2023-10-28 NOTE — ED ADULT NURSE NOTE - NS ED NURSE LEVEL OF CONSCIOUSNESS AFFECT
Calm/Appropriate Bexarotene Counseling:  I discussed with the patient the risks of bexarotene including but not limited to hair loss, dry lips/skin/eyes, liver abnormalities, hyperlipidemia, pancreatitis, depression/suicidal ideation, photosensitivity, drug rash/allergic reactions, hypothyroidism, anemia, leukopenia, infection, cataracts, and teratogenicity.  Patient understands that they will need regular blood tests to check lipid profile, liver function tests, white blood cell count, thyroid function tests and pregnancy test if applicable.

## 2023-10-28 NOTE — ED PROVIDER NOTE - OBJECTIVE STATEMENT
87 y/o F with h/o HTN, hyperlipidemia, GERD, endometrial ca here with syncopal episode 89 y/o F with h/o HTN, hyperlipidemia, GERD, endometrial ca here with syncopal episode At home.  Patient states she was walking and felt lightheaded causing her to sit down on her couch and rested her head however unsure how long she was unconscious for.  States she got up after and was fine.  States that she went to urgent care for evaluation but she has had nasal congestion was found positive for COVID.  Endorsing some decreased p.o. intake today secondary to her symptoms.  She denies any dizziness, lightheadedness or headache at this time.  States she has been ambulating with no difficulty.  Denies any chest pain, shortness of breath, abdominal pain, nausea vomiting diarrhea.  Denies any recent travel or sick contacts at home as she lives alone.

## 2023-10-28 NOTE — ED ADULT TRIAGE NOTE - CHIEF COMPLAINT QUOTE
Pt reporting to the ED for syncope this morning with LOC, did not hit her head, was sitting in chair. Pt covis + today at urgent care. Advised to come to ED for eval.

## 2023-10-28 NOTE — ED ADULT NURSE NOTE - NSICDXPASTMEDICALHX_GEN_ALL_CORE_FT
PAST MEDICAL HISTORY:  Endometrial cancer     [place] (hard of hearing) left > right - no hearing aids    Hyperlipidemia     Hypertension

## 2023-10-28 NOTE — ED PROVIDER NOTE - PROGRESS NOTE DETAILS
Thierno Story DO (PGY2)  Spoke with patient's primary care physician Dr. Caraballo who recommends admission for further work-up and evaluation of syncope.  Will order basic labs, chest x-ray, EKG.  Patient not requiring any oxygen at this time and ambulated with no difficulty.

## 2023-10-29 DIAGNOSIS — R55 SYNCOPE AND COLLAPSE: ICD-10-CM

## 2023-10-29 DIAGNOSIS — E78.5 HYPERLIPIDEMIA, UNSPECIFIED: ICD-10-CM

## 2023-10-29 DIAGNOSIS — I10 ESSENTIAL (PRIMARY) HYPERTENSION: ICD-10-CM

## 2023-10-29 DIAGNOSIS — U07.1 COVID-19: ICD-10-CM

## 2023-10-29 DIAGNOSIS — K21.9 GASTRO-ESOPHAGEAL REFLUX DISEASE WITHOUT ESOPHAGITIS: ICD-10-CM

## 2023-10-29 RX ORDER — NIFEDIPINE 30 MG
30 TABLET, EXTENDED RELEASE 24 HR ORAL DAILY
Refills: 0 | Status: DISCONTINUED | OUTPATIENT
Start: 2023-10-29 | End: 2023-11-01

## 2023-10-29 RX ORDER — SIMVASTATIN 20 MG/1
10 TABLET, FILM COATED ORAL AT BEDTIME
Refills: 0 | Status: DISCONTINUED | OUTPATIENT
Start: 2023-10-29 | End: 2023-11-01

## 2023-10-29 RX ORDER — LANOLIN ALCOHOL/MO/W.PET/CERES
3 CREAM (GRAM) TOPICAL AT BEDTIME
Refills: 0 | Status: DISCONTINUED | OUTPATIENT
Start: 2023-10-29 | End: 2023-11-01

## 2023-10-29 RX ORDER — ENOXAPARIN SODIUM 100 MG/ML
40 INJECTION SUBCUTANEOUS EVERY 24 HOURS
Refills: 0 | Status: DISCONTINUED | OUTPATIENT
Start: 2023-10-29 | End: 2023-11-01

## 2023-10-29 RX ORDER — ONDANSETRON 8 MG/1
4 TABLET, FILM COATED ORAL EVERY 8 HOURS
Refills: 0 | Status: DISCONTINUED | OUTPATIENT
Start: 2023-10-29 | End: 2023-11-01

## 2023-10-29 RX ORDER — FAMOTIDINE 10 MG/ML
20 INJECTION INTRAVENOUS DAILY
Refills: 0 | Status: DISCONTINUED | OUTPATIENT
Start: 2023-10-29 | End: 2023-11-01

## 2023-10-29 RX ORDER — ACETAMINOPHEN 500 MG
650 TABLET ORAL EVERY 6 HOURS
Refills: 0 | Status: DISCONTINUED | OUTPATIENT
Start: 2023-10-29 | End: 2023-11-01

## 2023-10-29 RX ORDER — CHOLECALCIFEROL (VITAMIN D3) 125 MCG
2000 CAPSULE ORAL DAILY
Refills: 0 | Status: DISCONTINUED | OUTPATIENT
Start: 2023-10-29 | End: 2023-11-01

## 2023-10-29 RX ORDER — ZINC SULFATE TAB 220 MG (50 MG ZINC EQUIVALENT) 220 (50 ZN) MG
220 TAB ORAL DAILY
Refills: 0 | Status: DISCONTINUED | OUTPATIENT
Start: 2023-10-29 | End: 2023-11-01

## 2023-10-29 RX ORDER — ASCORBIC ACID 60 MG
500 TABLET,CHEWABLE ORAL DAILY
Refills: 0 | Status: DISCONTINUED | OUTPATIENT
Start: 2023-10-29 | End: 2023-11-01

## 2023-10-29 RX ORDER — LOSARTAN POTASSIUM 100 MG/1
100 TABLET, FILM COATED ORAL DAILY
Refills: 0 | Status: DISCONTINUED | OUTPATIENT
Start: 2023-10-29 | End: 2023-11-01

## 2023-10-29 RX ADMIN — SIMVASTATIN 10 MILLIGRAM(S): 20 TABLET, FILM COATED ORAL at 21:43

## 2023-10-29 RX ADMIN — LOSARTAN POTASSIUM 100 MILLIGRAM(S): 100 TABLET, FILM COATED ORAL at 15:14

## 2023-10-29 NOTE — PATIENT PROFILE ADULT - PATIENT REPRESENTATIVE: ( YOU CAN CHOOSE ANY PERSON THAT CAN ASSIST YOU WITH YOUR HEALTH CARE PREFERENCES, DOES NOT HAVE TO BE A SPOUSE, IMMEDIATE FAMILY OR SIGNIFICANT OTHER/PARTNER)
declines Advancement Flap (Double) Text: The defect edges were debeveled with a #15 scalpel blade.  Given the location of the defect and the proximity to free margins a double advancement flap was deemed most appropriate.  Using a sterile surgical marker, the appropriate advancement flaps were drawn incorporating the defect and placing the expected incisions within the relaxed skin tension lines where possible.    The area thus outlined was incised deep to adipose tissue with a #15 scalpel blade.  The skin margins were undermined to an appropriate distance in all directions utilizing iris scissors.

## 2023-10-29 NOTE — H&P ADULT - PROBLEM SELECTOR PLAN 3
gentle IVH X 24 hrs  monitor  encourage PO intake  antiemetics if needed (pt not nauseated now Continue home medications and monitor.  cardio to follow

## 2023-10-29 NOTE — H&P ADULT - ASSESSMENT
Patient is an 87 yo woman with past medical history of  uterine cancer and ? bladder prolapse on recent 3 courses of abx for UTI, HLD, HTN, GERD BIBEMS from urology office for a syncope evaluation.  Patient is an 87 y/o woman with past medical history of  uterine cancer and ? bladder prolapse, recurrent UTIs,  HLD, HTN, GERD presented to the ED with syncopal episode At home.  Patient states she was walking and felt lightheaded causing her to sit down on her couch and rested her head however unsure how long she was unconscious for.  States she got up after and was fine.  States that she went to urgent care for evaluation but she has had nasal congestion was found positive for COVID.  Endorsed some decreased p.o. intake today secondary to her symptoms.    She denied any dizziness, lightheadedness or headache at any time.  States she has been ambulating with no difficulty.  Denies any chest pain, shortness of breath, abdominal pain, nausea vomiting diarrhea.  Denies any recent travel or sick contacts at home as she lives alone.  patient states has had syncope events in the past but negative workup

## 2023-10-29 NOTE — H&P ADULT - HISTORY OF PRESENT ILLNESS
>>>>>>Medical Attending Initial / Admission note<<<<<<  -------------------------------------------------------------------------------  CHIEF COMPLAINT & HISTORY OF PRESENT ILLNESS:      Patient is an 87 y/o woman with past medical history of  uterine cancer and ? bladder prolapse, recurrent UTIs,  HLD, HTN, GERD presented to the ED with syncopal episode At home.  Patient states she was walking and felt lightheaded causing her to sit down on her couch and rested her head however unsure how long she was unconscious for.  States she got up after and was fine.  States that she went to urgent care for evaluation but she has had nasal congestion was found positive for COVID.  Endorsed some decreased p.o. intake today secondary to her symptoms.    She denied any dizziness, lightheadedness or headache at any time.  States she has been ambulating with no difficulty.  Denies any chest pain, shortness of breath, abdominal pain, nausea vomiting diarrhea.  Denies any recent travel or sick contacts at home as she lives alone.  patient states has had syncope events in the past but negative workup      --------------------------------------------------------------------------------  PAST MEDICAL HISTORY:    Hypertension  Hyperlipidemia  Endometrial cancer  [place] (hard of hearing)  ? bladder prolapse     --------------------------------------------------------------------------------  PAST SURGICAL HISTORY:    Hysterectomy   Encounter for biopsy 4/2014 - endometrial biopsy  Ankle fracture left - s/p ORIF    --------------------------------------------------------------------------------  FAMILY HISTORY:    Mother and sister : breast cancer  father: heart disease  sister and father: + syncope events    --------------------------------------------------------------------------------  SOCIAL HISTORY:  Alcohol: None reported  Smoking: remote history     --------------------------------------------------------------------------------  ALLERGIES:    [As aaron, reviewed]    --------------------------------------------------------------------------------  MEDICATIONS:   [As bellow, reviewed]    --------------------------------------------------------------------------------  REVIEW OF SYSTEM:    GEN: no fever, no chills, no pain  RESP: no SOB, no cough, no sputum  CVS: no chest pain, no palpitations  GI: no abdominal pain, no nausea, no vomiting, no constipation, no diarrhea  : occasional dysuria, no frequency, no hematuria  Neuro: no headache, no dizziness  Derm : no itching    --------------------------------------------------------------------------------  VITALS:  T(C): 37.2 (10-29-23 @ 11:37), Max: 37.2 (10-29-23 @ 11:37)  HR: 72 (10-29-23 @ 11:37) (68 - 85)  BP: 152/60 (10-29-23 @ 11:37) (130/53 - 152/60)  RR: 20 (10-29-23 @ 11:37) (17 - 20)  SpO2: 98% (10-29-23 @ 11:37) (96% - 100%)   --------------------------------------------------------------------------------  PHYSICAL EXAM:    GEN: A&O X 3 , NAD , comfortable, pleasant, calm, appears clinically dehydrated   HEENT: NCAT, PERRL, MMM, hard of hearing , small area of bruising around and above rt eye   Neck: supple , no JVD  CVS: S1S2 , regular , No M/R/G appreciated, tachy   PULM: CTA B/L,  no W/R/R appreciated  ABD.: soft. non tender, non distended,  bowel sounds present  Extrem: intact pulses , no edema   Derm: No rash , no ecchymoses  PSYCH : normal mood,  no delusion not anxious    --------------------------------------------------------------------------------  LAB AND IMAGING:   [As aaron, reviewed]    --------------------------------------------------------------------------------  ASSESSMENT AND PLAN:   [As aaron]    --------------------  Case discussed with pt, cardio / EP   ___________________________  H. TIFFANIE Thomas.  Pager: [phone]  10-29-23

## 2023-10-29 NOTE — H&P ADULT - PROBLEM SELECTOR PLAN 1
unclear etiology  possible vasovagal / orthostatic cause given clinically dehydration, recent UTI, pain/ discomfort related to bladder and prior report of nausea ...  appreciated Cardio and EP consults  orthostatics  Echo  PT eval  IVH  monitor vitals, labs  tele positive COVID-19 with Viral syndrome, no respiratory component      only mild nasal congestion, occasional sneezing reported   not a candidate for Remdesivir per hospital protocol  not a candidate for Decadron per hospital protocol  check inflammatory markers as needed   O2 as needed   supportive care  nutrition, hydration  MVI, Vitamin C,D, Zinc, melatonin  deep breathing / IS  OOB to chair daily as able   DVT prophylaxis   Pepcid,

## 2023-10-29 NOTE — PATIENT PROFILE ADULT - FALL HARM RISK - HARM RISK INTERVENTIONS

## 2023-10-29 NOTE — H&P ADULT - PROBLEM SELECTOR PLAN 2
unclear etiology  recently treated for UTI as OP as above  U/A negative now  follow cultures already sent  IVH  monitor / repeat labs  hold off abx unless febrile / worsened  imaging of bladder . kidney  will need to find info on  pt was supposed to see .. but likely will need to follow up as  OP for bladder procedures.. unclear etiology  possible vasovagal / orthostatic cause given COVID / viral syndrome ?   Cardio and EP aware, to follow 9 considering possible ILR?)  orthostatics  PT eval  encourage PO hydration   monitor vitals, labs  tele

## 2023-10-30 LAB
ALBUMIN SERPL ELPH-MCNC: 3.6 G/DL — SIGNIFICANT CHANGE UP (ref 3.3–5)
ALBUMIN SERPL ELPH-MCNC: 3.6 G/DL — SIGNIFICANT CHANGE UP (ref 3.3–5)
ALP SERPL-CCNC: 66 U/L — SIGNIFICANT CHANGE UP (ref 40–120)
ALP SERPL-CCNC: 66 U/L — SIGNIFICANT CHANGE UP (ref 40–120)
ALT FLD-CCNC: 10 U/L — SIGNIFICANT CHANGE UP (ref 4–33)
ALT FLD-CCNC: 10 U/L — SIGNIFICANT CHANGE UP (ref 4–33)
ANION GAP SERPL CALC-SCNC: 16 MMOL/L — HIGH (ref 7–14)
ANION GAP SERPL CALC-SCNC: 16 MMOL/L — HIGH (ref 7–14)
AST SERPL-CCNC: 13 U/L — SIGNIFICANT CHANGE UP (ref 4–32)
AST SERPL-CCNC: 13 U/L — SIGNIFICANT CHANGE UP (ref 4–32)
BILIRUB SERPL-MCNC: 0.4 MG/DL — SIGNIFICANT CHANGE UP (ref 0.2–1.2)
BILIRUB SERPL-MCNC: 0.4 MG/DL — SIGNIFICANT CHANGE UP (ref 0.2–1.2)
BUN SERPL-MCNC: 16 MG/DL — SIGNIFICANT CHANGE UP (ref 7–23)
BUN SERPL-MCNC: 16 MG/DL — SIGNIFICANT CHANGE UP (ref 7–23)
CALCIUM SERPL-MCNC: 9 MG/DL — SIGNIFICANT CHANGE UP (ref 8.4–10.5)
CALCIUM SERPL-MCNC: 9 MG/DL — SIGNIFICANT CHANGE UP (ref 8.4–10.5)
CHLORIDE SERPL-SCNC: 101 MMOL/L — SIGNIFICANT CHANGE UP (ref 98–107)
CHLORIDE SERPL-SCNC: 101 MMOL/L — SIGNIFICANT CHANGE UP (ref 98–107)
CHOLEST SERPL-MCNC: 118 MG/DL — SIGNIFICANT CHANGE UP
CHOLEST SERPL-MCNC: 118 MG/DL — SIGNIFICANT CHANGE UP
CO2 SERPL-SCNC: 21 MMOL/L — LOW (ref 22–31)
CO2 SERPL-SCNC: 21 MMOL/L — LOW (ref 22–31)
CREAT SERPL-MCNC: 0.66 MG/DL — SIGNIFICANT CHANGE UP (ref 0.5–1.3)
CREAT SERPL-MCNC: 0.66 MG/DL — SIGNIFICANT CHANGE UP (ref 0.5–1.3)
CRP SERPL-MCNC: 7 MG/L — HIGH
CRP SERPL-MCNC: 7 MG/L — HIGH
D DIMER BLD IA.RAPID-MCNC: 156 NG/ML DDU — SIGNIFICANT CHANGE UP
D DIMER BLD IA.RAPID-MCNC: 156 NG/ML DDU — SIGNIFICANT CHANGE UP
EGFR: 85 ML/MIN/1.73M2 — SIGNIFICANT CHANGE UP
EGFR: 85 ML/MIN/1.73M2 — SIGNIFICANT CHANGE UP
ERYTHROCYTE [SEDIMENTATION RATE] IN BLOOD: 18 MM/HR — SIGNIFICANT CHANGE UP (ref 4–25)
ERYTHROCYTE [SEDIMENTATION RATE] IN BLOOD: 18 MM/HR — SIGNIFICANT CHANGE UP (ref 4–25)
FERRITIN SERPL-MCNC: 91 NG/ML — SIGNIFICANT CHANGE UP (ref 13–330)
FERRITIN SERPL-MCNC: 91 NG/ML — SIGNIFICANT CHANGE UP (ref 13–330)
GLUCOSE SERPL-MCNC: 92 MG/DL — SIGNIFICANT CHANGE UP (ref 70–99)
GLUCOSE SERPL-MCNC: 92 MG/DL — SIGNIFICANT CHANGE UP (ref 70–99)
HCT VFR BLD CALC: 40.5 % — SIGNIFICANT CHANGE UP (ref 34.5–45)
HCT VFR BLD CALC: 40.5 % — SIGNIFICANT CHANGE UP (ref 34.5–45)
HDLC SERPL-MCNC: 49 MG/DL — LOW
HDLC SERPL-MCNC: 49 MG/DL — LOW
HGB BLD-MCNC: 13.9 G/DL — SIGNIFICANT CHANGE UP (ref 11.5–15.5)
HGB BLD-MCNC: 13.9 G/DL — SIGNIFICANT CHANGE UP (ref 11.5–15.5)
LIPID PNL WITH DIRECT LDL SERPL: 50 MG/DL — SIGNIFICANT CHANGE UP
LIPID PNL WITH DIRECT LDL SERPL: 50 MG/DL — SIGNIFICANT CHANGE UP
MCHC RBC-ENTMCNC: 32.1 PG — SIGNIFICANT CHANGE UP (ref 27–34)
MCHC RBC-ENTMCNC: 32.1 PG — SIGNIFICANT CHANGE UP (ref 27–34)
MCHC RBC-ENTMCNC: 34.3 GM/DL — SIGNIFICANT CHANGE UP (ref 32–36)
MCHC RBC-ENTMCNC: 34.3 GM/DL — SIGNIFICANT CHANGE UP (ref 32–36)
MCV RBC AUTO: 93.5 FL — SIGNIFICANT CHANGE UP (ref 80–100)
MCV RBC AUTO: 93.5 FL — SIGNIFICANT CHANGE UP (ref 80–100)
NON HDL CHOLESTEROL: 69 MG/DL — SIGNIFICANT CHANGE UP
NON HDL CHOLESTEROL: 69 MG/DL — SIGNIFICANT CHANGE UP
NRBC # BLD: 0 /100 WBCS — SIGNIFICANT CHANGE UP (ref 0–0)
NRBC # BLD: 0 /100 WBCS — SIGNIFICANT CHANGE UP (ref 0–0)
NRBC # FLD: 0 K/UL — SIGNIFICANT CHANGE UP (ref 0–0)
NRBC # FLD: 0 K/UL — SIGNIFICANT CHANGE UP (ref 0–0)
PLATELET # BLD AUTO: 181 K/UL — SIGNIFICANT CHANGE UP (ref 150–400)
PLATELET # BLD AUTO: 181 K/UL — SIGNIFICANT CHANGE UP (ref 150–400)
POTASSIUM SERPL-MCNC: 3.8 MMOL/L — SIGNIFICANT CHANGE UP (ref 3.5–5.3)
POTASSIUM SERPL-MCNC: 3.8 MMOL/L — SIGNIFICANT CHANGE UP (ref 3.5–5.3)
POTASSIUM SERPL-SCNC: 3.8 MMOL/L — SIGNIFICANT CHANGE UP (ref 3.5–5.3)
POTASSIUM SERPL-SCNC: 3.8 MMOL/L — SIGNIFICANT CHANGE UP (ref 3.5–5.3)
PROCALCITONIN SERPL-MCNC: 0.07 NG/ML — SIGNIFICANT CHANGE UP (ref 0.02–0.1)
PROCALCITONIN SERPL-MCNC: 0.07 NG/ML — SIGNIFICANT CHANGE UP (ref 0.02–0.1)
PROT SERPL-MCNC: 6.4 G/DL — SIGNIFICANT CHANGE UP (ref 6–8.3)
PROT SERPL-MCNC: 6.4 G/DL — SIGNIFICANT CHANGE UP (ref 6–8.3)
RBC # BLD: 4.33 M/UL — SIGNIFICANT CHANGE UP (ref 3.8–5.2)
RBC # BLD: 4.33 M/UL — SIGNIFICANT CHANGE UP (ref 3.8–5.2)
RBC # FLD: 12.8 % — SIGNIFICANT CHANGE UP (ref 10.3–14.5)
RBC # FLD: 12.8 % — SIGNIFICANT CHANGE UP (ref 10.3–14.5)
SODIUM SERPL-SCNC: 138 MMOL/L — SIGNIFICANT CHANGE UP (ref 135–145)
SODIUM SERPL-SCNC: 138 MMOL/L — SIGNIFICANT CHANGE UP (ref 135–145)
TRIGL SERPL-MCNC: 96 MG/DL — SIGNIFICANT CHANGE UP
TRIGL SERPL-MCNC: 96 MG/DL — SIGNIFICANT CHANGE UP
WBC # BLD: 6.74 K/UL — SIGNIFICANT CHANGE UP (ref 3.8–10.5)
WBC # BLD: 6.74 K/UL — SIGNIFICANT CHANGE UP (ref 3.8–10.5)
WBC # FLD AUTO: 6.74 K/UL — SIGNIFICANT CHANGE UP (ref 3.8–10.5)
WBC # FLD AUTO: 6.74 K/UL — SIGNIFICANT CHANGE UP (ref 3.8–10.5)

## 2023-10-30 PROCEDURE — 70450 CT HEAD/BRAIN W/O DYE: CPT | Mod: 26

## 2023-10-30 PROCEDURE — 99231 SBSQ HOSP IP/OBS SF/LOW 25: CPT

## 2023-10-30 RX ADMIN — FAMOTIDINE 20 MILLIGRAM(S): 10 INJECTION INTRAVENOUS at 14:50

## 2023-10-30 RX ADMIN — SIMVASTATIN 10 MILLIGRAM(S): 20 TABLET, FILM COATED ORAL at 20:33

## 2023-10-30 RX ADMIN — ENOXAPARIN SODIUM 40 MILLIGRAM(S): 100 INJECTION SUBCUTANEOUS at 05:37

## 2023-10-30 RX ADMIN — LOSARTAN POTASSIUM 100 MILLIGRAM(S): 100 TABLET, FILM COATED ORAL at 05:38

## 2023-10-30 RX ADMIN — Medication 30 MILLIGRAM(S): at 05:38

## 2023-10-30 NOTE — PHYSICAL THERAPY INITIAL EVALUATION ADULT - GENERAL OBSERVATIONS, REHAB EVAL
Pt. received in bed supine, no apparent distress. Pt. received in bed supine, no apparent distress, +tele.

## 2023-10-30 NOTE — PHYSICAL THERAPY INITIAL EVALUATION ADULT - ACTIVE RANGE OF MOTION EXAMINATION, REHAB EVAL
bilateral lower extremity Active ROM was WNL (within normal limits) sam. upper extremity Active ROM was WNL (within normal limits)/bilateral lower extremity Active ROM was WNL (within normal limits)

## 2023-10-30 NOTE — CONSULT NOTE ADULT - ASSESSMENT
89 y/o woman with past medical history of  uterine cancer, bladder prolapse, recurrent UTIs,  HLD, HTN, GERD and vasovagal syncope since age 26 years presented to the ED with syncope in the setting of COVID infection. Patient had prodrome of dizziness and states she has had multiple syncopal event since in her 20's, sometimes associated with prodrome and sometimes with post event vomiting. Her father, sister and daughter all diagnosed with vasovagal syncope. Her work-up has been negative.   EP called for ILR.  Echo pending.  Last echo 06/2022 with normal LV function. Minimal mitral regurgitation. No aortic stenosis.   Spoke with patient about the benefit of an ILR for detection of an arrhythmia as a cause of her syncope. She has refused.

## 2023-10-30 NOTE — PHYSICAL THERAPY INITIAL EVALUATION ADULT - NSPTDISCHREC_GEN_A_CORE
Pt. does not need skilled PT at this time. Pt. will be removed from program./No skilled PT needs Home with no skilled PT needs. Pt. presents without gross impairment and is independent with functional mobility. Skilled, therapeutic PT intervention not indicated at this time. Pt. will be discharged from PT program.

## 2023-10-30 NOTE — PROGRESS NOTE ADULT - ASSESSMENT
{\rtf1\sqrqxz57636\ansi\adrzhzc2389\ftnbj\uc1\deff0  {\fonttbl{\f0 \fnil Segoe UI;}{\f1 \fnil \fcharset0 Segoe UI;}{\f2 \fnil Times New [place];}}  {\colortbl ;\plz397\jssjw176\wwoa934 ;\red0\green0\blue0 ;\red0\green0\blue0 ;}  {\stylesheet{\f0\fs20 Normal;}{\cs1 Default Paragraph Font;}{\cs2\f0\fs16 Line Number;}{\cs3\f2\fs24 Hyperlink;}}  {\*\revtbl{Unknown;}}  \rxmcpy72504\rxsjkf20462\wuete7459\fecng3126\cdzfh3454\qstug3417\bddnwpe324\lvhzdxz483\nogrowautofit\qirbmt105\formshade\nofeaturethrottle1\dntblnsbdb\fet4\aendnotes\aftnnrlc\pgbrdrhead\pgbrdrfoot  \sectd\lkyoby65011\zesgzt94728\guttersxn0\zhwlxjnh1447\bpnfrsnt1638\meesggis0545\pjoyloyt3433\ivfvaaz651\ojrtify201\sbkpage\pgncont\pgndec  \plain\plain\f0\fs24\pard\plain\f0\fs24\plain\f0\fs20\imfg2462\hich\f0\dbch\f0\loch\f0\fs20 _________________________________________________________________________________________\par  ========>>  M E D I C A L   A T T E N D I N G    F O L L O W  U P  N O T E  <<=========\par  -----------------------------------------------------------------------------------------------------\par  \par  - Patient seen and examined by me earlier today. \par  - In summary,  EMILY PIMENTEL is a 88y year old woman admitted with Syncope, COVID \par  - Patient today overall doing ok, comfortable, eating OK. a little nasal congestion, othewsi feels well\\\par  \par  patient wants to let us know that her daughter was diagnosed with vasovagal syncope .. and wonders if she has same thing > patient educated and discussed / reassured..   patient being considered for possible ILR ( which patient seems to be ok with )\par  \par  ==================>> REVIEW OF SYSTEM <<=================\par  \par  GEN: no fever, no chills, no pain\par  RESP: no SOB, no cough, no sputum\par  CVS: no chest pain, no palpitations\par  GI: no abdominal pain, no nausea, no constipation, no diarrhea\par  : no dysuria, no frequency\par  Neuro: no headache, no dizziness\par  \par  ==================>> PHYSICAL EXAM <<=================\par  \par  GEN: A&O X 3 , NAD , comfortable, pleasant, calm \par  HEENT: NCAT, PERRL, MMM, hearing intact\par  CVS: S1S2 , regular , No M/R/G appreciated\par  PULM: CTA B/L,  no W/R/R appreciated\par  ABD.: soft. non tender, non distended,  bowel sounds present\par  Extrem: intact pulses , no edema \par  \par        ( Note written / Date of service 10-30-23 ( This is certified to be the same as "ENTERED" date above ( for billing purposes)))\par    ==================>> MEDICATIONS <<====================\par  \par  MEDICATIONS  (STANDING):\par  ascorbic acid 500 milliGRAM(s) Oral daily\par  cholecalciferol 2000 Unit(s) Oral daily\par  enoxaparin Injectable 40 milliGRAM(s) SubCutaneous every 24 hours\par  famotidine    Tablet 20 milliGRAM(s) Oral daily\par  losartan 100 milliGRAM(s) Oral daily\par  multivitamin 1 Tablet(s) Oral daily\par  NIFEdipine XL 30 milliGRAM(s) Oral daily\par  simvastatin 10 milliGRAM(s) Oral at bedtime\par  zinc sulfate 220 milliGRAM(s) Oral daily\par  \par  MEDICATIONS  (PRN):\par  acetaminophen     Tablet .. 650 milliGRAM(s) Oral every 6 hours PRN Temp greater or equal to 38C (100.4F), Mild Pain (1 - 3)\par  aluminum hydroxide/magnesium hydroxide/simethicone Suspension 30 milliLiter(s) Oral every 4 hours PRN Dyspepsia\par  melatonin 3 milliGRAM(s) Oral at bedtime PRN Insomnia\par  ondansetron Injectable 4 milliGRAM(s) IV Push every 8 hours PRN Nausea and/or Vomiting\par  \par  ___________\par  Active diet:\par  Diet, DASH/TLC: \par  Sodium & Cholesterol Restricted\par  ___________________\par  \par  ==================>> VITAL SIGNS <<==================\par  \par  T(C): 36.9 (10-30-23 @ 05:30), Max: 36.9 (10-30-23 @ 05:30)\par  HR: 80 (10-30-23 @ 05:30) (72 - 80)\par  BP: 140/67 (10-30-23 @ 05:30) (140/67 - 156/85)\par  RR: 16 (10-30-23 @ 05:30) (16 - 20)\par  SpO2: 97% (10-30-23 @ 05:30) (97% - 98%) \par  \par  I&O's Summary\par  \par  30 Oct 2023 07:01  -  30 Oct 2023 12:41\par  --------------------------------------------------------\par  IN: 200 mL / OUT: 0 mL / NET: 200 mL\par  \par   ==================>> LAB AND IMAGING <<==================\par           \par             13.9 \par  6.74  )-----------( 181      ( 30 Oct 2023 05:45 )\par             40.5 \par     \par  10-30\par  \par  138  |  101  |  16\par  ----------------------------<  92\par  3.8   |  21<L>  |  0.66\par  \par  Ca    9.0      30 Oct 2023 05:45\par  \par  TPro  6.4  /  Alb  3.6  /  TBili  0.4  /  DBili  x   /  AST  13  /  ALT  10  /  AlkPhos  66  10-30\par  \par  \par  Urinalysis Basic - ( 30 Oct 2023 05:45 )\par  Color: x / Appearance: x / SG: x / pH: x\par  Gluc: 92 mg/dL / Ketone: x  / Bili: x / Urobili: x \par  Blood: x / Protein: x / Nitrite: x \par  Leuk Esterase: x / RBC: x / WBC x \par  Sq Epi: x / Non Sq Epi: x / Bacteria: x\par  \par  Lipid profile:  (10-30-23)\par     Total: 118\par     LDL  : (p)\par     HDL  :49\par     TG   :96\par  \par  ^^^ Inflammatory markers :  ^^^\par  C R P : \par         7.0 (10-30-23)  <<--\par  P C T :\par         0.07 (10-30-23) <<--\par  E S R : \par       18 (10-30-23) \par  Ferritin :\par         91 (10-30-23) <<--\par  \par  D-Dimer :\par          156 (10-30-23) <<--\par  \par  ___________________________________________________________________________________\par  ===============>>  A S S E S S M E N T   A N D   P L A N <<===============\par  ------------------------------------------------------------------------------------------\par  \par  \trowd\nzvtkd56\lastrow\xbjzufk81\trpaddfl3\lkdrrrt25\trpaddfr3\trpaddt0\trpaddft3\trpaddb0\trpaddfb3\trleft0  \clvertalt\prbelz61\clpadft3\mcymnn84\clpadfr3\clpadl0\clpadfl3\clpadb0\clpadfb3\adwqk0237  \clvertalt\cbrpla61\clpadft3\mepijt06\clpadfr3\clpadl0\clpadfl3\clpadb0\clpadfb3\trvkb3778  \pard\intbl\ssparaaux0\s0\fi-120\li120\ql\plain\f0\fs24{\*\bkmkstart [id number]}{\*\bkmkend [id number]}\plain\f0\fs20\vgvh2316\hich\f0\dbch\f0\loch\f0\fs20 \'b7 \plain\f1\fs20\zxin7621\hich\f1\dbch\f1\loch\f1\cf2\fs20\b Assessment\plain\f0\fs20\cben4284\hich\f0\dbch\f0\loch\f0\fs20\cell  \pard\intbl\ssparaaux0\s0\ql\plain\f0\fs24\plain\f0\fs20\ffpt5488\hich\f0\dbch\f0\loch\f0\fs20\cell  \intbl\row  \pard\ssparaaux0\s0\ql\plain\f0\fs24\plain\f0\fs20\xeoj5075\hich\f0\dbch\f0\loch\f0\fs20 Patient is an 89 y/o woman with past medical history of  uterine cancer and ? bladder prolapse, recurrent UTIs,  HLD, HTN, GERD presented to the ED with syncopal   episode At home.\par  Patient states she was walking and felt lightheaded causing her to sit down on her couch and rested her head however unsure how long she was unconscious for.  States she got up after and was fine.  States that she went to urgent care for evaluation but   she has had nasal congestion was found positive for COVID.  Endorsed some decreased p.o. intake today secondary to her symptoms.  \par  She denied any dizziness, lightheadedness or headache at any time.  States she has been ambulating with no difficulty.  Denies any chest pain, shortness of breath, abdominal pain, nausea vomiting diarrhea.  Denies any recent travel or sick contacts at   home as she lives alone.\par  patient states has had syncope events in the past but negative workup  \par  \par  \plain\f1\fs20\nbta1121\hich\f1\dbch\f1\loch\f1\cf2\fs20\strike\plain\f1\fs20\upwh5341\hich\f1\dbch\f1\loch\f1\cf2\fs20\plain\f0\fs20\dmxm7698\hich\f0\dbch\f0\loch\f0\fs20\par  \plain\f1\fs20\svyp4774\hich\f1\dbch\f1\loch\f1\cf2\fs20\b\ul{\field{\*\fldinst HYPERLINK [id number] }{\fldrslt Problem/Plan - 1:}}\plain\f0\fs20\tpah6978\hich\f0\dbch\f0\loch\f0\fs20\ql\par  \'b7  {\*\bkmkstart [id number]}{\*\bkmkend ys24191344617}Problem: {\*\bkmkstart kw89749711604}{\*\bkmkend ku45661768875}2019 novel coronavirus disease (COVID-19).\plain\f1\fs20\fwil2842\hich\f1\dbch\f1\loch\f1\cf2\fs20\strike\plain\f0\fs20\amvg1359\hich\f0\dbch\f0\loch\f0\fs20\par  \'b7  {\*\bkmkstart re80860948860}{\*\bkmkend dg14553778264}Plan: {\*\bkmkstart wg94144060445}{\*\bkmkend ap30299614383}positive COVID-19 with Viral syndrome, no respiratory component \par     only mild nasal congestion, occasional sneezing reported \par  not a candidate for Remdesivir per hospital protocol\par  not a candidate for Decadron per hospital protocol\par  check inflammatory markers as needed \par  O2 as needed \par  supportive care\par  nutrition, hydration\par  MVI, Vitamin C,D, Zinc, melatonin\par  deep breathing / IS\par  OOB to chair daily as able \par  DVT prophylaxis \par  Pepcid,\plain\f1\fs20\omdj0099\hich\f1\dbch\f1\loch\f1\cf2\fs20\strike\plain\f0\fs20\ryzn0278\hich\f0\dbch\f0\loch\f0\fs20\par  \plain\f1\fs20\oklb4508\hich\f1\dbch\f1\loch\f1\cf2\fs20\b\ul{\field{\*\fldinst HYPERLINK [id number] }{\fldrslt Problem/Plan - 2:}}\plain\f0\fs20\cdkb9794\hich\f0\dbch\f0\loch\f0\fs20\ql\par  \'b7  {\*\bkmkstart wz45427177241}{\*\bkmkend iu18224222268}Problem: {\*\bkmkstart au40956293038}{\*\bkmkend jq31644136063}Syncope.\plain\f1\fs20\zwpt7612\hich\f1\dbch\f1\loch\f1\cf2\fs20\strike\plain\f0\fs20\okkw7072\hich\f0\dbch\f0\loch\f0\fs20\par  \'b7  {\*\bkmkstart yo05991904291}{\*\bkmkend ub66318114672}Plan: {\*\bkmkstart oc32288205954}{\*\bkmkend qh36155857591}unclear etiology\par  possible vasovagal / orthostatic cause given COVID / viral syndrome ? \par  Cardio and EP aware, to follow 9 considering possible ILR?)\par  orthostatics\par  PT evaluation / OOb to chair \par  encourage PO hydration \par  monitor vitals, labs\par  tele.\plain\f1\fs20\vnwl2261\hich\f1\dbch\f1\loch\f1\cf2\fs20\strike\plain\f0\fs20\hzvg3022\hich\f0\dbch\f0\loch\f0\fs20\par  \par  \plain\f1\fs20\bykm0513\hich\f1\dbch\f1\loch\f1\cf2\fs20\b\ul{\field{\*\fldinst HYPERLINK [id number] }{\fldrslt Problem/Plan - 3:}}\plain\f0\fs20\fqtm4110\hich\f0\dbch\f0\loch\f0\fs20\ql\par  \'b7  {\*\bkmkstart ew16547619696}{\*\bkmkend xb20806772986}Problem: {\*\bkmkstart db40757540894}{\*\bkmkend fg18220252845}Hypertension.\plain\f1\fs20\gufq2760\hich\f1\dbch\f1\loch\f1\cf2\fs20\strike\plain\f0\fs20\amsa3652\hich\f0\dbch\f0\loch\f0\fs20\par  \'b7  {\*\bkmkstart zp15552522135}{\*\bkmkend vd20358554936}Plan: {\*\bkmkstart to00621237829}{\*\bkmkend ur07427289220}Continue home medications and monitor.\par  cardio on board \plain\f1\fs20\plbm8663\hich\f1\dbch\f1\loch\f1\cf2\fs20\strike\plain\f0\fs20\fona1714\hich\f0\dbch\f0\loch\f0\fs20\par  \plain\f1\fs20\bbuu7728\hich\f1\dbch\f1\loch\f1\cf2\fs20\b\ul{\field{\*\fldinst HYPERLINK [id number] }{\fldrslt Problem/Plan - 4:}}\plain\f0\fs20\matk6082\hich\f0\dbch\f0\loch\f0\fs20\ql\par  \'b7  {\*\bkmkstart vy54411537751}{\*\bkmkend xq58573544429}Problem: {\*\bkmkstart zk37028050296}{\*\bkmkend ah81562171388}HLD (hyperlipidemia).\plain\f1\fs20\wnoa1795\hich\f1\dbch\f1\loch\f1\cf2\fs20\strike\plain\f0\fs20\pbfz4084\hich\f0\dbch\f0\loch\f0\fs20\par  \'b7  {\*\bkmkstart ie63260186999}{\*\bkmkend qu34402860246}Plan: {\*\bkmkstart sy82877368376}{\*\bkmkend lb68330179294}statin.\plain\f1\fs20\juff6059\hich\f1\dbch\f1\loch\f1\cf2\fs20\strike\plain\f0\fs20\oljz2687\hich\f0\dbch\f0\loch\f0\fs20\par  \par  \plain\f1\fs20\owyp7954\hich\f1\dbch\f1\loch\f1\cf2\fs20\b\ul{\field{\*\fldinst HYPERLINK [id number] }{\fldrslt Problem/Plan - 5:}}\plain\f0\fs20\rjqd7738\hich\f0\dbch\f0\loch\f0\fs20\ql\par  \'b7  {\*\bkmkstart lp83081620508}{\*\bkmkend yb47605490075}Problem: {\*\bkmkstart nq87598803729}{\*\bkmkend uz38641662532}Chronic GERD. \par  \'b7  {\*\bkmkstart ie34109585135}{\*\bkmkend ht76891125393}Plan: {\*\bkmkstart ay26079766017}{\*\bkmkend lf99748662688}pepcid.\par  \pard\plain\f0\fs24\plain\f0\fs20\lkky9093\hich\f0\dbch\f0\loch\f0\fs20\par  --------------------------------------------\par  Case discussed with patient.. \par  Education given on findings and plan of care\par  ___________________________\par  JEREMY Thomas D.O.\par  Pager: [phone]\par  \par  \ql\plain\f0\fs24\plain\f0\fs20\otsj9970\hich\f0\dbch\f0\loch\f0\fs20\par  }

## 2023-10-30 NOTE — PHYSICAL THERAPY INITIAL EVALUATION ADULT - LEVEL OF INDEPENDENCE: SIT/SUPINE, REHAB EVAL
independent Alert and oriented to person, place and time, memory intact, behavior appropriate to situation, PERRL.

## 2023-10-30 NOTE — PHYSICAL THERAPY INITIAL EVALUATION ADULT - ADDITIONAL COMMENTS
Pt. was left seated at edge of bed post PT Evaluation, no apparent distress, all lines intact, HR 91 bpm, call bell within reach.

## 2023-10-30 NOTE — CONSULT NOTE ADULT - SUBJECTIVE AND OBJECTIVE BOX
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 Patient states she was walking and felt lightheaded and sat down on the couch. That is the last she remembers and is unsure how long she was unconscious. When she came to she felt at baseline except for the pain next to her right eye.   She went to urgent care for evaluation and because she had nasal congestion, was tested for COVID and found to be positive.  Endorsed some decreased p.o. intake that day secondary to her symptoms.  She denied any dizziness, lightheadedness or headache at any time.  States she has been ambulating with no difficulty.  Denies palpitations, chest pain, shortness of breath, abdominal pain, nausea vomiting diarrhea.  Denies any recent travel or sick contacts at home as she lives alone. Patient states she has had multiple syncopal event since in her 20's, sometimes associated with prodrome and sometimes with post event vomiting. Her father, sister and daughter all diagnosed with vasovagal syncope. Her work-up has been negative.   EP called for ILR.          PAST MEDICAL & SURGICAL HISTORY:  Hypertension  Hyperlipidemia  Endometrial cancer w/biopsy 4/2014  [place] (hard of hearing) left > right - no hearing aids  Ankle fracture left - s/p ORIF  GERD      MEDICATIONS  (STANDING):  ascorbic acid 500 milliGRAM(s) Oral daily  cholecalciferol 2000 Unit(s) Oral daily  enoxaparin Injectable 40 milliGRAM(s) SubCutaneous every 24 hours  famotidine    Tablet 20 milliGRAM(s) Oral daily  losartan 100 milliGRAM(s) Oral daily  multivitamin 1 Tablet(s) Oral daily  NIFEdipine XL 30 milliGRAM(s) Oral daily  simvastatin 10 milliGRAM(s) Oral at bedtime  zinc sulfate 220 milliGRAM(s) Oral daily    MEDICATIONS  (PRN):  acetaminophen     Tablet .. 650 milliGRAM(s) Oral every 6 hours PRN Temp greater or equal to 38C (100.4F), Mild Pain (1 - 3)  aluminum hydroxide/magnesium hydroxide/simethicone Suspension 30 milliLiter(s) Oral every 4 hours PRN Dyspepsia  melatonin 3 milliGRAM(s) Oral at bedtime PRN Insomnia  ondansetron Injectable 4 milliGRAM(s) IV Push every 8 hours PRN Nausea and/or Vomiting      FAMILY HISTORY:      SOCIAL HISTORY: lives alone    CIGARETTES: never  no  ALCOHOL: no      REVIEW OF SYSTEMS:    CONSTITUTIONAL: No fever, weight loss, chills, shakes, or fatigue  EYES: right periorbital pain without visual disturbances or discharge  ENMT:  [place] No tinnitus, vertigo; No sinus or throat pain  NECK: No pain or stiffness  BREASTS: No pain, masses, or nipple discharge  RESPIRATORY: No cough, wheezing, hemoptysis, or shortness of breath  + nasal congestion  CARDIOVASCULAR: No chest pain, dyspnea, palpitations, paroxysmal nocturnal dyspnea, orthopnea, or arm or leg swelling  +dizziness/syncope  GASTROINTESTINAL: No abdominal  or epigastric pain, nausea, vomiting, hematemesis, diarrhea, constipation, melena or bright red blood.  GENITOURINARY: bladder prolapse w/recurrent UTI's.  NEUROLOGICAL: No headaches, memory loss, slurred speech, limb weakness, loss of strength, numbness, or tremors  SKIN: No itching, burning, rashes, or lesions   LYMPH NODES: No enlarged glands  ENDOCRINE: No heat or cold intolerance, or hair loss  MUSCULOSKELETAL: No joint pain or swelling, muscle, back, or extremity pain  PSYCHIATRIC: No depression, anxiety, or difficulty sleeping  HEME/LYMPH: No easy bruising or bleeding gums  ALLERGY AND IMMUNOLOGIC: No hives or rash.      Vital Signs Last 24 Hrs  T(C): 36.9 (30 Oct 2023 13:00), Max: 36.9 (30 Oct 2023 05:30)  T(F): 98.4 (30 Oct 2023 13:00), Max: 98.4 (30 Oct 2023 05:30)  HR: 78 (30 Oct 2023 13:00) (75 - 80)  BP: 137/68 (30 Oct 2023 13:00) (137/68 - 156/85)  BP(mean): --  RR: 17 (30 Oct 2023 13:00) (16 - 20)  SpO2: 95% (30 Oct 2023 13:00) (95% - 98%)    Parameters below as of 30 Oct 2023 13:00  Patient On (Oxygen Delivery Method): room air        PHYSICAL EXAM:    GENERAL: In no apparent distress, well nourished, and hydrated.   HEAD:  Atraumatic, Normocephalic  EYES: EOMI, PERRLA, conjunctiva and sclera clear  ENMT: No tonsillar erythema, exudates, or enlargement; Moist mucous membranes,  NECK: Supple and normal thyroid.  No JVD or carotid bruit.   HEART: Regular rate and rhythm; No murmurs, rubs, or gallops.  PULMONARY: Clear to auscultation and perfusion.  No rales, wheezing, or rhonchi bilaterally.  ABDOMEN: Soft, Nontender, Nondistended; Bowel sounds present  EXTREMITIES:  2+ Peripheral Pulses, No clubbing, cyanosis, or edema  LYMPH: No lymphadenopathy noted  NEUROLOGICAL: Grossly nonfocal          INTERPRETATION OF TELEMETRY: Sinus rhythm with heart rate trends 60-80's.     ECG: Normal sinus rhythm 84 bpm. QRSd 84ms  QTc 420ms.       LABS:                        13.9   6.74  )-----------( 181      ( 30 Oct 2023 05:45 )             40.5     10-30    138  |  101  |  16  ----------------------------<  92  3.8   |  21<L>  |  0.66    Ca    9.0      30 Oct 2023 05:45    TPro  6.4  /  Alb  3.6  /  TBili  0.4  /  DBili  x   /  AST  13  /  ALT  10  /  AlkPhos  66  10-30          Urinalysis Basic - ( 30 Oct 2023 05:45 )    Color: x / Appearance: x / SG: x / pH: x  Gluc: 92 mg/dL / Ketone: x  / Bili: x / Urobili: x   Blood: x / Protein: x / Nitrite: x   Leuk Esterase: x / RBC: x / WBC x   Sq Epi: x / Non Sq Epi: x / Bacteria: x            RADIOLOGY & ADDITIONAL STUDIES:  PREVIOUS DIAGNOSTIC TESTING:    < from: CT Head No Cont (10.30.23 @ 16:23) >  ACC: [id number] EXAM:  CT BRAIN   ORDERED BY: LAYLA BHATIA     PROCEDURE DATE:  10/30/2023          INTERPRETATION:  EXAM: CT HEAD WITHOUT INTRAVENOUS CONTRAST    HISTORY: Trauma, status post fall    TECHNIQUE: Multiple axial images were obtained at 5 mm intervals from the   skull base to the vertex. Sagittal and coronal reformatted images were   obtained from the axial data set. The images were reviewed in brain and   bone windows.    COMPARISON: No prior studies are available for comparison    FINDINGS:    There is no acute intracranial hemorrhage. Areas of decreased attenuation   in the deep and periventricular white matter, compatible with chronic   small vessel disease. Central parenchymal volume loss. There is no   hydrocephalus. The extra-axial spaces and basal cisterns are within   normal limits. There is no midline shift or mass effect present.    The cranial cervical junction is within normal limits. The sella is not   expanded. There is no depressed calvarial fracture. Mucosal thickening in   the ethmoid air cells. Small air-fluid level in the right maxillary   sinus. Postoperative changes related to a small right mastoidectomy. The   mastoid air cells appear well aerated. The visualized orbits are status   post cataract surgery.    IMPRESSION:    1.  No evidence of acute intracranial hemorrhage or midline shift.  2.  Chronic small vessel disease.    --- End of Report ---    SATINDER DOMINGUEZ MD; Attending Radiologist  This document has been electronically signed. Oct 30 2023  4:41PM            ECHO  FINDINGS: none this admission  Patient name: EMILY PIMENTEL  YOB: 1935   Age: 86 (F)   MR#: [id number]  Study Date: 6/1/2022  [facility] SHARON Machuca  Study quality: Technically Difficult  Referring Physician: Yogi Thomas MD  Blood Pressure: 152/81 mmHg  Height: 163 cm  Weight: 64 kg  BSA: 1.7 m2  ------------------------------------------------------------------------  PROCEDURE: Transthoracic echocardiogram with 2-D, M-Mode  and complete spectral and color flow Doppler.  INDICATION: Syncope and collapse (R55)  ------------------------------------------------------------------------  OBSERVATIONS:  Mitral Valve: Mitral annular calcification, otherwise  normal mitral valve. Minimal mitral regurgitation.  Aortic Root: Normal aortic root.  Aortic Valve: Calcified trileaflet aortic valve with normal  opening. Minimal aortic regurgitation.  Left Atrium: Normal left atrium.  Left Ventricle: Endocardium not well visualized; grossly  normal left ventricular systolic function.  Right Heart: Normal right atrium. The right ventricle is  not well visualized; grossly normal right ventricular  systolic function. Normal tricuspid valve. Mild tricuspid  regurgitation. Normal pulmonic valve. Minimal pulmonic  regurgitation.  Pericardium/PleuraNormal pericardium with no pericardial  effusion.  ------------------------------------------------------------------------  CONCLUSIONS:  1. Mitral annular calcification, otherwise normal mitral  valve. Minimal mitral regurgitation.  2.Calcified trileaflet aortic valve with normal opening.  Minimal aortic regurgitation.  3. Endocardium not well visualized; grossly normal left  ventricular systolic function.  4. The right ventricle is not well visualized; grossly  normal right ventricular systolic function.  ------------------------------------------------------------------------  Confirmed on  6/1/2022 - 16:28:05 by Garfield Kruse M.D.,  [facility], [facility]              
CHIEF COMPLAINT: syncope    HISTORY OF PRESENT ILLNESS:  87 y/o woman with past medical history of  uterine cancer and ? bladder prolapse, recurrent UTIs,  HLD, HTN, GERD presented to the ED with syncopal episode At home.  Patient states she was walking and felt lightheaded causing her to sit down on her couch and rested her head however unsure how long she was unconscious for.  States she got up after and was fine.  States that she went to urgent care for evaluation but she has had nasal congestion was found positive for COVID.  Endorsed some decreased p.o. intake today secondary to her symptoms.    She denied any dizziness, lightheadedness or headache at any time.  States she has been ambulating with no difficulty.  Denies any chest pain, shortness of breath, abdominal pain, nausea vomiting diarrhea.  Denies any recent travel or sick contacts at home as she lives alone.  patient states has had syncope events in the past but negative workup        Allergies    No Known Allergies    Intolerances    	    MEDICATIONS:  enoxaparin Injectable 40 milliGRAM(s) SubCutaneous every 24 hours  losartan 100 milliGRAM(s) Oral daily  NIFEdipine XL 30 milliGRAM(s) Oral daily        acetaminophen     Tablet .. 650 milliGRAM(s) Oral every 6 hours PRN  melatonin 3 milliGRAM(s) Oral at bedtime PRN  ondansetron Injectable 4 milliGRAM(s) IV Push every 8 hours PRN    aluminum hydroxide/magnesium hydroxide/simethicone Suspension 30 milliLiter(s) Oral every 4 hours PRN  famotidine    Tablet 20 milliGRAM(s) Oral daily    simvastatin 10 milliGRAM(s) Oral at bedtime    ascorbic acid 500 milliGRAM(s) Oral daily  cholecalciferol 2000 Unit(s) Oral daily  multivitamin 1 Tablet(s) Oral daily  zinc sulfate 220 milliGRAM(s) Oral daily      PAST MEDICAL & SURGICAL HISTORY:  Hypertension      Hyperlipidemia      Endometrial cancer      [place] (hard of hearing)  left > right - no hearing aids      Encounter for biopsy  4/2014 - endometrial biopsy      Ankle fracture  left - s/p ORIF          FAMILY HISTORY:      SOCIAL HISTORY:    non smoker. indep in adl      REVIEW OF SYSTEMS:  See HPI, otherwise complete 10 point review of systems negative    [ ] All others negative	    PHYSICAL EXAM:  T(C): 36.9 (10-30-23 @ 05:30), Max: 37.2 (10-29-23 @ 11:37)  HR: 80 (10-30-23 @ 05:30) (72 - 80)  BP: 140/67 (10-30-23 @ 05:30) (140/67 - 156/85)  RR: 16 (10-30-23 @ 05:30) (16 - 20)  SpO2: 97% (10-30-23 @ 05:30) (97% - 98%)  Wt(kg): --  I&O's Summary      Appearance: No Acute Distress	  HEENT:  Normal oral mucosa, PERRL, EOMI	  Cardiovascular: Normal S1 S2, No JVD, No murmurs/rubs/gallops  Respiratory: Lungs clear to auscultation bilaterally  Gastrointestinal:  Soft, Non-tender, + BS	  Skin: No rashes, No ecchymoses, No cyanosis	  Neurologic: Non-focal  Extremities: No clubbing, cyanosis or edema  Vascular: Peripheral pulses palpable 2+ bilaterally  Psychiatry: A & O x 3, Mood & affect appropriate    Laboratory Data:	 	    CBC Full  -  ( 30 Oct 2023 05:45 )  WBC Count : 6.74 K/uL  Hemoglobin : 13.9 g/dL  Hematocrit : 40.5 %  Platelet Count - Automated : 181 K/uL  Mean Cell Volume : 93.5 fL  Mean Cell Hemoglobin : 32.1 pg  Mean Cell Hemoglobin Concentration : 34.3 gm/dL  Auto Neutrophil # : x  Auto Lymphocyte # : x  Auto Monocyte # : x  Auto Eosinophil # : x  Auto Basophil # : x  Auto Neutrophil % : x  Auto Lymphocyte % : x  Auto Monocyte % : x  Auto Eosinophil % : x  Auto Basophil % : x    10-28    137  |  101  |  16  ----------------------------<  120<H>  4.3   |  23  |  0.65    Ca    9.9      28 Oct 2023 17:09    TPro  7.2  /  Alb  4.2  /  TBili  0.5  /  DBili  x   /  AST  14  /  ALT  10  /  AlkPhos  76  10-28      proBNP:   Lipid Profile:   HgA1c:   TSH:       CARDIAC MARKERS:            Interpretation of Telemetry: 	    ECG:  	  RADIOLOGY:  OTHER: 	    PREVIOUS DIAGNOSTIC TESTING:    [ ] Echocardiogram:  [ ] Catheterization:  [ ] Stress Test:  	    Assessment:  87 y/o woman with past medical history of  uterine cancer and ? bladder prolapse, recurrent UTIs,  HLD, HTN, GERD presented to the ED with syncopal episode     Recs:  cardiac stable  cw tele monitoring  orthostatics prn sx  obtain echo  eps consult for ILR  tx of covid per primary team  will follow        Advanced care planning forms were discussed. Code status including forceful chest compressions, defibrillation and intubation were discussed. The risks benefits and alternatives to pertinent cardiac procedures and interventions were discussed in detail and all questions were answered. Duration: 15 minutes.  Greater than 90 minutes spent on total encounter; more than 50% of the visit was spent counseling and/or coordinating care by the attending physician.   	  Sarbjit Caraballo MD   Cardiovascular Diseases  [phone]

## 2023-10-30 NOTE — CONSULT NOTE ADULT - NS ATTEND AMEND GEN_ALL_CORE FT
88-year-old female with a history of uterine cancer, bladder prolapse, recurrent UTIs, hypertension, hyperlipidemia, GERD, a long history of vasovagal syncope presented with syncope in the setting of a COVID infection.  Her syncope by history appears to be viral related and vagal.  EP was called for an ILR placement.  The patient declined ILR placement.  EP to sign off.

## 2023-10-31 ENCOUNTER — TRANSCRIPTION ENCOUNTER (OUTPATIENT)
Age: 88
End: 2023-10-31

## 2023-10-31 LAB
ALBUMIN SERPL ELPH-MCNC: 3.6 G/DL — SIGNIFICANT CHANGE UP (ref 3.3–5)
ALBUMIN SERPL ELPH-MCNC: 3.6 G/DL — SIGNIFICANT CHANGE UP (ref 3.3–5)
ALP SERPL-CCNC: 63 U/L — SIGNIFICANT CHANGE UP (ref 40–120)
ALP SERPL-CCNC: 63 U/L — SIGNIFICANT CHANGE UP (ref 40–120)
ALT FLD-CCNC: 9 U/L — SIGNIFICANT CHANGE UP (ref 4–33)
ALT FLD-CCNC: 9 U/L — SIGNIFICANT CHANGE UP (ref 4–33)
ANION GAP SERPL CALC-SCNC: 13 MMOL/L — SIGNIFICANT CHANGE UP (ref 7–14)
ANION GAP SERPL CALC-SCNC: 13 MMOL/L — SIGNIFICANT CHANGE UP (ref 7–14)
AST SERPL-CCNC: 12 U/L — SIGNIFICANT CHANGE UP (ref 4–32)
AST SERPL-CCNC: 12 U/L — SIGNIFICANT CHANGE UP (ref 4–32)
BILIRUB SERPL-MCNC: 0.5 MG/DL — SIGNIFICANT CHANGE UP (ref 0.2–1.2)
BILIRUB SERPL-MCNC: 0.5 MG/DL — SIGNIFICANT CHANGE UP (ref 0.2–1.2)
BUN SERPL-MCNC: 18 MG/DL — SIGNIFICANT CHANGE UP (ref 7–23)
BUN SERPL-MCNC: 18 MG/DL — SIGNIFICANT CHANGE UP (ref 7–23)
CALCIUM SERPL-MCNC: 9 MG/DL — SIGNIFICANT CHANGE UP (ref 8.4–10.5)
CALCIUM SERPL-MCNC: 9 MG/DL — SIGNIFICANT CHANGE UP (ref 8.4–10.5)
CHLORIDE SERPL-SCNC: 104 MMOL/L — SIGNIFICANT CHANGE UP (ref 98–107)
CHLORIDE SERPL-SCNC: 104 MMOL/L — SIGNIFICANT CHANGE UP (ref 98–107)
CO2 SERPL-SCNC: 25 MMOL/L — SIGNIFICANT CHANGE UP (ref 22–31)
CO2 SERPL-SCNC: 25 MMOL/L — SIGNIFICANT CHANGE UP (ref 22–31)
CREAT SERPL-MCNC: 0.7 MG/DL — SIGNIFICANT CHANGE UP (ref 0.5–1.3)
CREAT SERPL-MCNC: 0.7 MG/DL — SIGNIFICANT CHANGE UP (ref 0.5–1.3)
EGFR: 84 ML/MIN/1.73M2 — SIGNIFICANT CHANGE UP
EGFR: 84 ML/MIN/1.73M2 — SIGNIFICANT CHANGE UP
GLUCOSE SERPL-MCNC: 101 MG/DL — HIGH (ref 70–99)
GLUCOSE SERPL-MCNC: 101 MG/DL — HIGH (ref 70–99)
HCT VFR BLD CALC: 41.6 % — SIGNIFICANT CHANGE UP (ref 34.5–45)
HCT VFR BLD CALC: 41.6 % — SIGNIFICANT CHANGE UP (ref 34.5–45)
HGB BLD-MCNC: 13.8 G/DL — SIGNIFICANT CHANGE UP (ref 11.5–15.5)
HGB BLD-MCNC: 13.8 G/DL — SIGNIFICANT CHANGE UP (ref 11.5–15.5)
MCHC RBC-ENTMCNC: 31.6 PG — SIGNIFICANT CHANGE UP (ref 27–34)
MCHC RBC-ENTMCNC: 31.6 PG — SIGNIFICANT CHANGE UP (ref 27–34)
MCHC RBC-ENTMCNC: 33.2 GM/DL — SIGNIFICANT CHANGE UP (ref 32–36)
MCHC RBC-ENTMCNC: 33.2 GM/DL — SIGNIFICANT CHANGE UP (ref 32–36)
MCV RBC AUTO: 95.2 FL — SIGNIFICANT CHANGE UP (ref 80–100)
MCV RBC AUTO: 95.2 FL — SIGNIFICANT CHANGE UP (ref 80–100)
NRBC # BLD: 0 /100 WBCS — SIGNIFICANT CHANGE UP (ref 0–0)
NRBC # BLD: 0 /100 WBCS — SIGNIFICANT CHANGE UP (ref 0–0)
NRBC # FLD: 0 K/UL — SIGNIFICANT CHANGE UP (ref 0–0)
NRBC # FLD: 0 K/UL — SIGNIFICANT CHANGE UP (ref 0–0)
PLATELET # BLD AUTO: 193 K/UL — SIGNIFICANT CHANGE UP (ref 150–400)
PLATELET # BLD AUTO: 193 K/UL — SIGNIFICANT CHANGE UP (ref 150–400)
POTASSIUM SERPL-MCNC: 3.8 MMOL/L — SIGNIFICANT CHANGE UP (ref 3.5–5.3)
POTASSIUM SERPL-MCNC: 3.8 MMOL/L — SIGNIFICANT CHANGE UP (ref 3.5–5.3)
POTASSIUM SERPL-SCNC: 3.8 MMOL/L — SIGNIFICANT CHANGE UP (ref 3.5–5.3)
POTASSIUM SERPL-SCNC: 3.8 MMOL/L — SIGNIFICANT CHANGE UP (ref 3.5–5.3)
PROT SERPL-MCNC: 6.4 G/DL — SIGNIFICANT CHANGE UP (ref 6–8.3)
PROT SERPL-MCNC: 6.4 G/DL — SIGNIFICANT CHANGE UP (ref 6–8.3)
RBC # BLD: 4.37 M/UL — SIGNIFICANT CHANGE UP (ref 3.8–5.2)
RBC # BLD: 4.37 M/UL — SIGNIFICANT CHANGE UP (ref 3.8–5.2)
RBC # FLD: 12.5 % — SIGNIFICANT CHANGE UP (ref 10.3–14.5)
RBC # FLD: 12.5 % — SIGNIFICANT CHANGE UP (ref 10.3–14.5)
SODIUM SERPL-SCNC: 142 MMOL/L — SIGNIFICANT CHANGE UP (ref 135–145)
SODIUM SERPL-SCNC: 142 MMOL/L — SIGNIFICANT CHANGE UP (ref 135–145)
WBC # BLD: 5.49 K/UL — SIGNIFICANT CHANGE UP (ref 3.8–10.5)
WBC # BLD: 5.49 K/UL — SIGNIFICANT CHANGE UP (ref 3.8–10.5)
WBC # FLD AUTO: 5.49 K/UL — SIGNIFICANT CHANGE UP (ref 3.8–10.5)
WBC # FLD AUTO: 5.49 K/UL — SIGNIFICANT CHANGE UP (ref 3.8–10.5)

## 2023-10-31 PROCEDURE — 93306 TTE W/DOPPLER COMPLETE: CPT | Mod: 26

## 2023-10-31 RX ADMIN — LOSARTAN POTASSIUM 100 MILLIGRAM(S): 100 TABLET, FILM COATED ORAL at 05:09

## 2023-10-31 RX ADMIN — ENOXAPARIN SODIUM 40 MILLIGRAM(S): 100 INJECTION SUBCUTANEOUS at 05:08

## 2023-10-31 RX ADMIN — SIMVASTATIN 10 MILLIGRAM(S): 20 TABLET, FILM COATED ORAL at 21:20

## 2023-10-31 RX ADMIN — Medication 30 MILLIGRAM(S): at 05:09

## 2023-10-31 RX ADMIN — FAMOTIDINE 20 MILLIGRAM(S): 10 INJECTION INTRAVENOUS at 10:22

## 2023-10-31 NOTE — DISCHARGE NOTE PROVIDER - CARE PROVIDER_API CALL
Riccardo Caraballo  Cardiovascular Disease  [address] [place] [address]  Phone: [phone]  Fax: [phone]  Established Patient  Follow Up Time: 1 week    Fernie Starr  Cardiac Electrophysiology  [address] [place] [address], [address]  [place] [address]  Phone: [phone]  Fax: [phone]  Follow Up Time:

## 2023-10-31 NOTE — DISCHARGE NOTE PROVIDER - NSDCCPCAREPLAN_GEN_ALL_CORE_FT
PRINCIPAL DISCHARGE DIAGNOSIS  Diagnosis: Syncope  Assessment and Plan of Treatment: You were seen in the hospital for a syncopal episode also known as loss of consciousness. You were seen by cardiology and electrophysiology who recommended a loop recorder but you refused. You were monitored on telemetry and are now cleared for discharge by cardiology. Please follow up as outpatient with your PCP and Cardiology.      SECONDARY DISCHARGE DIAGNOSES  Diagnosis: Hypertension  Assessment and Plan of Treatment: Continue blood pressure medication regimen as directed. Monitor for any visual changes, headaches or dizziness.  Monitor blood pressure regularly.  Follow up with your primary care provider for further management for high blood pressure.      Diagnosis: HLD (hyperlipidemia)  Assessment and Plan of Treatment: Continue prescribed medications to control your cholesterol levels and a DASH (Low fat/salt) diet. Follow up with your primary care provider upon discharge for further management and monitoring of cholesterol levels.      Diagnosis: Chronic GERD  Assessment and Plan of Treatment: Continue current medication management and follow up with your primary doctor.    Diagnosis: 2019 novel coronavirus disease (COVID-19)  Assessment and Plan of Treatment: You tested positive for COVID 19 and are having minimal upper respiratory symptoms such as runny nose and congestion. You have not required supplemental oxygen during your hospital stay. Please continue to wear a mask and quarantine until your symptoms resolve. Return to ER if you develop any new or worsening trouble breathing, fever or chills or any concerning symptoms. Please see your PCP in one week.     PRINCIPAL DISCHARGE DIAGNOSIS  Diagnosis: Syncope  Assessment and Plan of Treatment: You were seen in the hospital for a syncopal episode also known as loss of consciousness. You were seen by cardiology and electrophysiology who recommended a loop recorder but you refused. You were monitored on telemetry and are now cleared for discharge by cardiology. Please follow up as outpatient with your PCP and Cardiology.      SECONDARY DISCHARGE DIAGNOSES  Diagnosis: 2019 novel coronavirus disease (COVID-19)  Assessment and Plan of Treatment: You tested positive for COVID 19 and are having minimal upper respiratory symptoms such as runny nose and congestion. You have not required supplemental oxygen during your hospital stay. Please continue to wear a mask and quarantine until your symptoms resolve. Return to ER if you develop any new or worsening trouble breathing, fever or chills or any concerning symptoms. Please see your PCP in one week.    Diagnosis: Hypertension  Assessment and Plan of Treatment: Continue blood pressure medication regimen as directed. Monitor for any visual changes, headaches or dizziness.  Monitor blood pressure regularly.  Follow up with your primary care provider for further management for high blood pressure.      Diagnosis: HLD (hyperlipidemia)  Assessment and Plan of Treatment: Continue prescribed medications to control your cholesterol levels and a DASH (Low fat/salt) diet. Follow up with your primary care provider upon discharge for further management and monitoring of cholesterol levels.      Diagnosis: Chronic GERD  Assessment and Plan of Treatment: Continue current medication management and follow up with your primary doctor.    Diagnosis: Liver cyst  Assessment and Plan of Treatment: You had an incidental finding of a cyst in your liver. Therefore, you had a CT scan to further characterize the finding. The CT showed "Liver cysts, largest measuring 2.9 cm in the right hepatic lobe. No suspicious lesions". Please follow up as outpatient with your PCP.    Diagnosis: Renal mass  Assessment and Plan of Treatment: You have history of a known renal mass. You had a CT scan which showed "Stable indeterminate 1.7 cm lesion in the right kidney." Please follow up as outpatient with your primary doctor.

## 2023-10-31 NOTE — PROVIDER CONTACT NOTE (OTHER) - ASSESSMENT
pt refusing vitamin C, vitamin D, multivitamin, zinc sulfate and pepcid  education provided on indication and pt verbalizes understanding
pt refused orthos at this time, Stated "I'm too tired right now, I will do it later." No s.s of acute distress safety maintained. Will endorse to AM nurse.

## 2023-10-31 NOTE — PROVIDER CONTACT NOTE (OTHER) - SITUATION
pt refusing vitamin C, vitamin D, multivitamin, zinc sulfate and pepcid
pt refused orthos at this times,

## 2023-10-31 NOTE — PROGRESS NOTE ADULT - ASSESSMENT
_________________________________________________________________________________________  ========>>  M E D I C A L   A T T E N D I N G    F O L L O W  U P  N O T E  <<=========  -----------------------------------------------------------------------------------------------------    - Patient seen and examined by me earlier today.   - In summary,  EMILY PIMENTEL is a 88y year old woman admitted with Syncope, COVID   - Patient today overall doing ok, comfortable, eating OK. a little nasal congestion, alonzohewsi feels well\    patient wants to let us know that her daughter was diagnosed with vasovagal syncope .. and wonders if she has same thing > patient educated and discussed / reassured..   patient being considered for possible ILR ( which patient seems to be ok with )    ==================>> REVIEW OF SYSTEM <<=================    GEN: no fever, no chills, no pain  RESP: no SOB, no cough, no sputum  CVS: no chest pain, no palpitations  GI: no abdominal pain, no nausea, no constipation, no diarrhea  : no dysuria, no frequency  Neuro: no headache, no dizziness    ==================>> PHYSICAL EXAM <<=================    GEN: A&O X 3 , NAD , comfortable, pleasant, calm   HEENT: NCAT, PERRL, MMM, hearing intact  CVS: S1S2 , regular , No M/R/G appreciated  PULM: CTA B/L,  no W/R/R appreciated  ABD.: soft. non tender, non distended,  bowel sounds present  Extrem: intact pulses , no edema          ( Note written / Date of service 10-31-23 ( This is certified to be the same as "ENTERED" date above ( for billing purposes)))    ==================>> MEDICATIONS <<====================    ascorbic acid 500 milliGRAM(s) Oral daily  cholecalciferol 2000 Unit(s) Oral daily  enoxaparin Injectable 40 milliGRAM(s) SubCutaneous every 24 hours  famotidine    Tablet 20 milliGRAM(s) Oral daily  losartan 100 milliGRAM(s) Oral daily  multivitamin 1 Tablet(s) Oral daily  NIFEdipine XL 30 milliGRAM(s) Oral daily  simvastatin 10 milliGRAM(s) Oral at bedtime  zinc sulfate 220 milliGRAM(s) Oral daily    MEDICATIONS  (PRN):  acetaminophen     Tablet .. 650 milliGRAM(s) Oral every 6 hours PRN Temp greater or equal to 38C (100.4F), Mild Pain (1 - 3)  aluminum hydroxide/magnesium hydroxide/simethicone Suspension 30 milliLiter(s) Oral every 4 hours PRN Dyspepsia  melatonin 3 milliGRAM(s) Oral at bedtime PRN Insomnia  ondansetron Injectable 4 milliGRAM(s) IV Push every 8 hours PRN Nausea and/or Vomiting    ___________  Active diet:  Diet, DASH/TLC:   Sodium & Cholesterol Restricted  ___________________    ==================>> VITAL SIGNS <<==================  Height (cm): 162.6  Weight (kg): 85.2  BMI (kg/m2): 32.2  Vital Signs Last 24 HrsT(C): 36.4 (10-31-23 @ 10:56)  T(F): 97.6 (10-31-23 @ 10:56), Max: 97.6 (10-31-23 @ 05:00)  HR: 81 (10-31-23 @ 10:56) (70 - 81)  BP: 132/60 (10-31-23 @ 10:56)  RR: 17 (10-31-23 @ 10:56) (17 - 18)  SpO2: 96% (10-31-23 @ 10:56) (95% - 96%)      CAPILLARY BLOOD GLUCOSE         ==================>> LAB AND IMAGING <<==================                        13.8   5.49  )-----------( 193      ( 31 Oct 2023 06:30 )             41.6        10-31    142  |  104  |  18  ----------------------------<  101<H>  3.8   |  25  |  0.70    Ca    9.0      31 Oct 2023 06:30    TPro  6.4  /  Alb  3.6  /  TBili  0.5  /  DBili  x   /  AST  12  /  ALT  9   /  AlkPhos  63  10-31    WBC count:   5.49 <<== ,  6.74 <<== ,  8.41 <<==   Hemoglobin:   13.8 <<==,  13.9 <<==,  14.0 <<==  platelets:  193 <==, 181 <==, 193 <==    Creatinine:  0.70  <<==, 0.66  <<==, 0.65  <<==  Sodium:   142  <==, 138  <==, 137  <==       AST:          12 <== , 13 <== , 14 <==      ALT:        9  <== , 10  <== , 10  <==      AP:        63  <=, 66  <=, 76  <=     Bili:        0.5  <=, 0.4  <=, 0.5  <=    ____________________________    M I C R O B I O L O G Y :        ^^^ Inflammatory markers :  ^^^  C R P :          7.0 (10-30-23)  <<--  P C T :         0.07 (10-30-23) <<--  E S R :        18 (10-30-23)   Ferritin :         91 (10-30-23) <<--    D-Dimer :          156 (10-30-23) <<--    ___________________________________________________________________________________  ===============>>  A S S E S S M E N T   A N D   P L A N <<===============  ------------------------------------------------------------------------------------------    · Assessment	  Patient is an 89 y/o woman with past medical history of  uterine cancer and ? bladder prolapse, recurrent UTIs,  HLD, HTN, GERD presented to the ED with syncopal episode At home.  Patient states she was walking and felt lightheaded causing her to sit down on her couch and rested her head however unsure how long she was unconscious for.  States she got up after and was fine.  States that she went to urgent care for evaluation but she has had nasal congestion was found positive for COVID.  Endorsed some decreased p.o. intake today secondary to her symptoms.    She denied any dizziness, lightheadedness or headache at any time.  States she has been ambulating with no difficulty.  Denies any chest pain, shortness of breath, abdominal pain, nausea vomiting diarrhea.  Denies any recent travel or sick contacts at home as she lives alone.  patient states has had syncope events in the past but negative workup        Problem/Plan - 1:  ·  Problem: 2019 novel coronavirus disease (COVID-19).  ·  Plan: positive COVID-19 with Viral syndrome, no respiratory component      only mild nasal congestion, occasional sneezing reported   not a candidate for Remdesivir per hospital protocol  not a candidate for Decadron per hospital protocol  check inflammatory markers as needed   O2 as needed   supportive care  nutrition, hydration  MVI, Vitamin C,D, Zinc, melatonin  deep breathing / IS  OOB to chair daily as able   DVT prophylaxis   Pepcid,  Problem/Plan - 2:  ·  Problem: Syncope.  ·  Plan: unclear etiology  possible vasovagal / orthostatic cause given COVID / viral syndrome ?   Cardio and EP aware, to follow 9 considering possible ILR?)  orthostatics  PT evaluation / OOb to chair   encourage PO hydration   monitor vitals, labs  tele.    Problem/Plan - 3:  ·  Problem: Hypertension.  ·  Plan: Continue home medications and monitor.  cardio on board   Problem/Plan - 4:  ·  Problem: HLD (hyperlipidemia).  ·  Plan: statin.    Problem/Plan - 5:  ·  Problem: Chronic GERD.   ·  Plan: pepcid.    --------------------------------------------  Case discussed with patient..   Education given on findings and plan of care  ___________________________  H. TIFFANIE Thomas.  Pager: [phone]     _________________________________________________________________________________________  ========>>  M E D I C A L   A T T E N D I N G    F O L L O W  U P  N O T E  <<=========  -----------------------------------------------------------------------------------------------------    - Patient seen and examined by me earlier today.   - In summary,  EMILY PIMENTEL is a 88y year old woman admitted with Syncope, COVID   - Patient today overall doing ok, comfortable, eating OK. a little nasal congestion, otjessicawsi feels well\ Asking about going home    Patients declined ILR Offered by EP as noted    ==================>> REVIEW OF SYSTEM <<=================    GEN: no fever, no chills, no pain  RESP: no SOB, no cough, no sputum  CVS: no chest pain, no palpitations  GI: no abdominal pain, no nausea, no constipation, no diarrhea  : no dysuria, no frequency  Neuro: no headache, no dizziness    ==================>> PHYSICAL EXAM <<=================    GEN: A&O X 3 , NAD , comfortable, pleasant, calm   HEENT: NCAT, PERRL, MMM, hearing intact  CVS: S1S2 , regular , No M/R/G appreciated  PULM: CTA B/L,  no W/R/R appreciated  ABD.: soft. non tender, non distended,  bowel sounds present  Extrem: intact pulses , no edema          ( Note written / Date of service 10-31-23 ( This is certified to be the same as "ENTERED" date above ( for billing purposes)))    ==================>> MEDICATIONS <<====================    ascorbic acid 500 milliGRAM(s) Oral daily  cholecalciferol 2000 Unit(s) Oral daily  enoxaparin Injectable 40 milliGRAM(s) SubCutaneous every 24 hours  famotidine    Tablet 20 milliGRAM(s) Oral daily  losartan 100 milliGRAM(s) Oral daily  multivitamin 1 Tablet(s) Oral daily  NIFEdipine XL 30 milliGRAM(s) Oral daily  simvastatin 10 milliGRAM(s) Oral at bedtime  zinc sulfate 220 milliGRAM(s) Oral daily    MEDICATIONS  (PRN):  acetaminophen     Tablet .. 650 milliGRAM(s) Oral every 6 hours PRN Temp greater or equal to 38C (100.4F), Mild Pain (1 - 3)  aluminum hydroxide/magnesium hydroxide/simethicone Suspension 30 milliLiter(s) Oral every 4 hours PRN Dyspepsia  melatonin 3 milliGRAM(s) Oral at bedtime PRN Insomnia  ondansetron Injectable 4 milliGRAM(s) IV Push every 8 hours PRN Nausea and/or Vomiting    ___________  Active diet:  Diet, DASH/TLC:   Sodium & Cholesterol Restricted  ___________________    ==================>> VITAL SIGNS <<==================  Height (cm): 162.6  Weight (kg): 85.2  BMI (kg/m2): 32.2  Vital Signs Last 24 HrsT(C): 36.4 (10-31-23 @ 10:56)  T(F): 97.6 (10-31-23 @ 10:56), Max: 97.6 (10-31-23 @ 05:00)  HR: 81 (10-31-23 @ 10:56) (70 - 81)  BP: 132/60 (10-31-23 @ 10:56)  RR: 17 (10-31-23 @ 10:56) (17 - 18)  SpO2: 96% (10-31-23 @ 10:56) (95% - 96%)         ==================>> LAB AND IMAGING <<==================                        13.8   5.49  )-----------( 193      ( 31 Oct 2023 06:30 )             41.6        10-31    142  |  104  |  18  ----------------------------<  101<H>  3.8   |  25  |  0.70    Ca    9.0      31 Oct 2023 06:30    TPro  6.4  /  Alb  3.6  /  TBili  0.5  /  DBili  x   /  AST  12  /  ALT  9   /  AlkPhos  63  10-31    WBC count:   5.49 <<== ,  6.74 <<== ,  8.41 <<==   Hemoglobin:   13.8 <<==,  13.9 <<==,  14.0 <<==  platelets:  193 <==, 181 <==, 193 <==    Creatinine:  0.70  <<==, 0.66  <<==, 0.65  <<==  Sodium:   142  <==, 138  <==, 137  <==       AST:          12 <== , 13 <== , 14 <==      ALT:        9  <== , 10  <== , 10  <==      AP:        63  <=, 66  <=, 76  <=     Bili:        0.5  <=, 0.4  <=, 0.5  <=      ^^^ Inflammatory markers :  ^^^  C R P :          7.0 (10-30-23)  <<--  P C T :         0.07 (10-30-23) <<--  E S R :        18 (10-30-23)   Ferritin :         91 (10-30-23) <<--    D-Dimer :          156 (10-30-23) <<--    < from: TTE W or WO Ultrasound Enhancing Agent (10.31.23 @ 16:00) >  CONCLUSIONS:    1. Left ventricular cavity is normal. Left ventricular wall thickness is normal. Left ventricular systolic function is normal with an ejection fraction of 66 % by Harman's method of disks. There are no regional wall motion abnormalities seen.   2. There is mild (grade 1) left ventricular diastolic dysfunction.   3. Normal right ventricular cavity size and systolic function.   4. Structurally normal mitral valve with normal leaflet excursion. There is calcification of the mitral valve annulus. There is trace mitral regurgitation.   5. A large (~ 8.8 cm X 6.5 cm) hepatic cyst is visualized. Consider dedicated abdominal imaging for further evaluation, if clinically indicated.  < end of copied text >    ___________________________________________________________________________________  ===============>>  A S S E S S M E N T   A N D   P L A N <<===============  ------------------------------------------------------------------------------------------    · Assessment	  Patient is an 89 y/o woman with past medical history of  uterine cancer and ? bladder prolapse, recurrent UTIs,  HLD, HTN, GERD presented to the ED with syncopal episode At home.  Patient states she was walking and felt lightheaded causing her to sit down on her couch and rested her head however unsure how long she was unconscious for.  States she got up after and was fine.  States that she went to urgent care for evaluation but she has had nasal congestion was found positive for COVID.  Endorsed some decreased p.o. intake today secondary to her symptoms.    She denied any dizziness, lightheadedness or headache at any time.  States she has been ambulating with no difficulty.  Denies any chest pain, shortness of breath, abdominal pain, nausea vomiting diarrhea.  Denies any recent travel or sick contacts at home as she lives alone.  patient states has had syncope events in the past but negative workup        Problem/Plan - 1:  ·  Problem: 2019 novel coronavirus disease (COVID-19).  ·  Plan: positive COVID-19 with Viral syndrome, no respiratory component      only mild nasal congestion, occasional sneezing reported   not a candidate for Remdesivir per hospital protocol  not a candidate for Decadron per hospital protocol  check inflammatory markers as needed   O2 as needed   supportive care  nutrition, hydration  MVI, Vitamin C,D, Zinc, melatonin  deep breathing / IS  OOB to chair daily as able   DVT prophylaxis   Pepcid,    Problem/Plan - 2:  ·  Problem: Syncope.  ·  Plan: unclear etiology  possible vasovagal / orthostatic cause given COVID / viral syndrome ?   Cardio and EP aware, to follow 9 considering possible ILR?)  orthostatics  PT evaluation / OOb to chair   encourage PO hydration   monitor vitals, labs  tele.    Echocardiogram was done eight today and resulted as above >>> Abdominal CAT scan with contrast ordered for further evaluation of liver Cyst and history of renal mass    Problem/Plan - 3:  ·  Problem: Hypertension.  ·  Plan: Continue home medications and monitor.  cardio on board     Problem/Plan - 4:  ·  Problem: HLD (hyperlipidemia).  ·  Plan: statin.    Problem/Plan - 5:  ·  Problem: Chronic GERD.   ·  Plan: pepcid.    --------------------------------------------  Case discussed with patient.. ACP  Education given on findings and plan of care  ___________________________  H. TIFFANIE Thomas.  Pager: [phone]

## 2023-10-31 NOTE — PROVIDER CONTACT NOTE (OTHER) - BACKGROUND
admitted for Syncope and Collapse
(Admit Diagnosis) Syncope and collapse  (PMH) Hypertension  (PMH) Hyperlipidemia

## 2023-10-31 NOTE — DISCHARGE NOTE PROVIDER - NSDCCAREPROVSEEN_GEN_ALL_CORE_FT
[facility]  Hoorbod Delshadfar  Hoorbod Delshadfar  Hoorbod Delshadfar  Adele Matias Jauregui  User ADM  Sarbjit Figueroa  Team [facility] ACP      [ Greater than 35 min spent for discharge services.   JEREMY [facility] ]       ( Note written / Date of service is the same as last day of patient stay  in the hospital ( for billing purposes)))

## 2023-10-31 NOTE — DISCHARGE NOTE PROVIDER - NSDCMRMEDTOKEN_GEN_ALL_CORE_FT
Calcium 600+D oral tablet: 1 tab(s) orally 2 times a day  Fish Oil:   ibuprofen 600 mg oral tablet: 1 tab(s) orally every 6 hours, As needed, mild pain  losartan 100 mg oral tablet: 1 tab(s) orally once a day  NIFEdipine 30 mg oral tablet, extended release: 1 tab(s) orally once a day  PriLOSEC 20 mg oral delayed release capsule: 1 cap(s) orally once a day  simvastatin 10 mg oral tablet: 1 tab(s) orally once a day (at bedtime)   ascorbic acid 500 mg oral tablet: 1 tab(s) orally once a day  Calcium 600+D oral tablet: 1 tab(s) orally 2 times a day  cholecalciferol 50 mcg (2000 intl units) oral tablet: 1 tab(s) orally once a day  Fish Oil:   losartan 100 mg oral tablet: 1 tab(s) orally once a day  Multiple Vitamins oral tablet: 1 tab(s) orally once a day  NIFEdipine 30 mg oral tablet, extended release: 1 tab(s) orally once a day  PriLOSEC 20 mg oral delayed release capsule: 1 cap(s) orally once a day  simvastatin 10 mg oral tablet: 1 tab(s) orally once a day (at bedtime)   ascorbic acid 500 mg oral tablet: 1 tab(s) orally once a day  aspirin 325 mg oral delayed release tablet: 1 tab(s) orally once a day  Calcium 600+D oral tablet: 1 tab(s) orally 2 times a day  cholecalciferol 50 mcg (2000 intl units) oral tablet: 1 tab(s) orally once a day  Fish Oil:   Left Tomas Knee Brace - 0-90 Degrees: Please leave in 0-90 degrees. Use at all times. MDD: 1  losartan 100 mg oral tablet: 1 tab(s) orally once a day  Multiple Vitamins oral tablet: 1 tab(s) orally once a day  NIFEdipine 30 mg oral tablet, extended release: 1 tab(s) orally once a day  PriLOSEC 20 mg oral delayed release capsule: 1 cap(s) orally once a day  simvastatin 10 mg oral tablet: 1 tab(s) orally once a day (at bedtime)

## 2023-10-31 NOTE — PROGRESS NOTE ADULT - SUBJECTIVE AND OBJECTIVE BOX
Cardiovascular Disease Progress Note    Overnight events: No acute events overnight.  no new cardiac sx  Otherwise review of systems negative    Objective Findings:  T(C): 36.4 (10-31-23 @ 05:00), Max: 36.9 (10-30-23 @ 13:00)  HR: 70 (10-31-23 @ 05:00) (70 - 78)  BP: 133/66 (10-31-23 @ 05:00) (133/66 - 147/78)  RR: 18 (10-31-23 @ 05:00) (17 - 18)  SpO2: 96% (10-31-23 @ 05:00) (95% - 96%)  Wt(kg): --  Daily     Daily       Physical Exam:  Gen: NAD  HEENT: EOMI  CV: RRR, normal S1 + S2, no m/r/g  Lungs: CTAB  Abd: soft, non-tender  Ext: No edema    Telemetry:    Laboratory Data:                        13.8   5.49  )-----------( 193      ( 31 Oct 2023 06:30 )             41.6     10-30    138  |  101  |  16  ----------------------------<  92  3.8   |  21<L>  |  0.66    Ca    9.0      30 Oct 2023 05:45    TPro  6.4  /  Alb  3.6  /  TBili  0.4  /  DBili  x   /  AST  13  /  ALT  10  /  AlkPhos  66  10-30              Inpatient Medications:  MEDICATIONS  (STANDING):  ascorbic acid 500 milliGRAM(s) Oral daily  cholecalciferol 2000 Unit(s) Oral daily  enoxaparin Injectable 40 milliGRAM(s) SubCutaneous every 24 hours  famotidine    Tablet 20 milliGRAM(s) Oral daily  losartan 100 milliGRAM(s) Oral daily  multivitamin 1 Tablet(s) Oral daily  NIFEdipine XL 30 milliGRAM(s) Oral daily  simvastatin 10 milliGRAM(s) Oral at bedtime  zinc sulfate 220 milliGRAM(s) Oral daily      Assessment:  89 y/o woman with past medical history of  uterine cancer and ? bladder prolapse, recurrent UTIs,  HLD, HTN, GERD presented to the ED with syncopal episode     Recs:  cardiac stable  cw tele monitoring  orthostatics prn sx  obtain echo  eps consulted, plan for ILR if agreeable  tx of covid per primary team  will follow          Over 25 minutes spent on total encounter; more than 50% of the visit was spent counseling and/or coordinating care by the attending physician.      Sarbjit Caraballo MD   Cardiovascular Disease  [phone]

## 2023-10-31 NOTE — DISCHARGE NOTE PROVIDER - PROVIDER TOKENS
PROVIDER:[TOKEN:[[id number]],FOLLOWUP:[1 week],ESTABLISHEDPATIENT:[T]],PROVIDER:[TOKEN:[[id number]]]

## 2023-10-31 NOTE — DISCHARGE NOTE PROVIDER - NSDCFUADDINST_GEN_ALL_CORE_FT
Return to ER if you develop new or worsening chest pain, trouble breathing, loss of consciousness, weakness, lightheadedness or any concerning symptoms

## 2023-10-31 NOTE — DISCHARGE NOTE PROVIDER - NSDCCPTREATMENT_GEN_ALL_CORE_FT
PRINCIPAL PROCEDURE  Procedure: 2D echo  Findings and Treatment: 1. Left ventricular cavity is normal. Left ventricular wall thickness is normal. Left ventricular systolic function is normal with an ejection fraction of 66 % by Harman's method of disks. There are no regional wall motion abnormalities seen.   2. There is mild (grade 1) left ventricular diastolic dysfunction.   3. Normal right ventricular cavity size and systolic function.   4. Structurally normal mitral valve with normal leaflet excursion. There is calcification of the mitral valve annulus. There is trace mitral regurgitation.   5. A large (~ 8.8 cm X 6.5 cm) hepatic cyst is visualized. Consider dedicated abdominal imaging for further evaluation, if clinically indicated.      SECONDARY PROCEDURE  Procedure: CT abdomen  Findings and Treatment: FINDINGS:  LOWER CHEST: Right basilar atelectasis.  LIVER: Liver cysts, largest measuring 2.9 cm in the right hepatic lobe.   Subcentimeter hypodensities too small to characterize. No suspicious   liver lesions.  BILE DUCTS: Normal caliber.  GALLBLADDER: Cholelithiasis.  SPLEEN: Within normal limits.  PANCREAS: Within normal limits.  ADRENALS: Within normal limits.  KIDNEYS/URETERS: Large exophytic right lower pole renal cyst measures   14.8 cm. Additional right renal cyst measures 4.4 cm. Indeterminate   lesion in the upper pole the right kidney measuring 1.7 cm, unchanged. No   hydronephrosis.  BLADDER: Within normal limits.  REPRODUCTIVE ORGANS: Hysterectomy.  BOWEL: No bowel obstruction. Diverticulosis. Moderate hiatal hernia.  Appendix is normal.  PERITONEUM: No ascites.  VESSELS: Vascular calcifications.  RETROPERITONEUM/LYMPH NODES: No lymphadenopathy.  ABDOMINAL WALL: Within normal limits.  BONES: Degenerative changes.  IMPRESSION:  *  Liver and renal cysts.  *  Stable indeterminate 1.7 cm lesion in the right kidney.

## 2023-10-31 NOTE — DISCHARGE NOTE PROVIDER - HOSPITAL COURSE
Patient is an 87 y/o woman with past medical history of  uterine cancer and ? bladder prolapse, recurrent UTIs,  HLD, HTN, GERD presented to the ED with syncopal episode At home.  Patient states she was walking and felt lightheaded causing her to sit down on her couch and rested her head however unsure how long she was unconscious for.  States she got up after and was fine.  States that she went to urgent care for evaluation but she has had nasal congestion was found positive for COVID.  Endorsed some decreased p.o. intake today secondary to her symptoms.    She denied any dizziness, lightheadedness or headache at any time.  States she has been ambulating with no difficulty.  Denies any chest pain, shortness of breath, abdominal pain, nausea vomiting diarrhea.  Denies any recent travel or sick contacts at home as she lives alone.  patient states has had syncope events in the past but negative workup      Hospital Course:    2019 novel coronavirus disease (COVID-19).  positive COVID-19 with Viral syndrome, no respiratory component   only mild nasal congestion, occasional sneezing reported   not a candidate for Remdesivir per hospital protocol  not a candidate for Decadron per hospital protocol  check inflammatory markers as needed   O2 as needed - stable on room air   supportive care  nutrition, hydration  MVI, Vitamin C,D, Zinc, melatonin  deep breathing / IS  OOB to chair daily as able   DVT prophylaxis   Pepcid    Syncope.  - unclear etiology  - possible vasovagal / orthostatic cause given COVID / viral syndrome ?   - Cardio and EP aware, to follow 9 considering possible ILR?)  - orthostatics  - PT evaluation / OOb to chair   - encourage PO hydration   - monitor vitals, labs  - tele.  - TTE [ ] ________________________________  - Refused ILR   - Cardiology consulted   - Outpatient PCP and Cardiology follow up     Hypertension.  - Continue home medications and monitor.  - cardio on board      HLD (hyperlipidemia).  - statin.    Chronic GERD.   - pepcid.    On ___ this case was reviewed with  ____, the patient is medically stable and optimized for discharge. All medications were reviewed and prescriptions were sent to mutually agreed upon pharmacy. Patient is an 89 y/o woman with past medical history of  uterine cancer and ? bladder prolapse, recurrent UTIs,  HLD, HTN, GERD presented to the ED with syncopal episode At home.  Patient states she was walking and felt lightheaded causing her to sit down on her couch and rested her head however unsure how long she was unconscious for.  States she got up after and was fine.  States that she went to urgent care for evaluation but she has had nasal congestion was found positive for COVID.  Endorsed some decreased p.o. intake today secondary to her symptoms.    She denied any dizziness, lightheadedness or headache at any time.  States she has been ambulating with no difficulty.  Denies any chest pain, shortness of breath, abdominal pain, nausea vomiting diarrhea.  Denies any recent travel or sick contacts at home as she lives alone.  patient states has had syncope events in the past but negative workup      Hospital Course:    2019 novel coronavirus disease (COVID-19).  positive COVID-19 with Viral syndrome, no respiratory component   only mild nasal congestion, occasional sneezing reported   not a candidate for Remdesivir per hospital protocol  not a candidate for Decadron per hospital protocol  check inflammatory markers as needed   O2 as needed - stable on room air   supportive care  nutrition, hydration  MVI, Vitamin C,D, Zinc, melatonin  deep breathing / IS  OOB to chair daily as able   DVT prophylaxis   Pepcid    Syncope.  - unclear etiology  - possible vasovagal / orthostatic cause given COVID / viral syndrome ?   - Cardio and EP aware, to follow 9 considering possible ILR?)  - orthostatics  - PT evaluation / OOb to chair   - encourage PO hydration   - monitor vitals, labs  - tele.  - TTE within normal limits, large hepatic cyst identified  - CT ordered for further characterization, stable hepatic cystic , no suspicious lesions  - Refused ILR   - Cardiology consulted   - Outpatient PCP and Cardiology follow up     Hypertension.  - Continue home medications and monitor.  - cardio on board      HLD (hyperlipidemia).  - statin.    Chronic GERD.   - pepcid.    On 11/1/23 this case was reviewed with Dr. Thomas, the patient is medically stable and optimized for discharge. All medications were reviewed and prescriptions were sent to mutually agreed upon pharmacy. Patient is an 89 y/o woman with past medical history of  uterine cancer and ? bladder prolapse, recurrent UTIs,  HLD, HTN, GERD presented to the ED with syncopal episode At home.  Patient states she was walking and felt lightheaded causing her to sit down on her couch and rested her head however unsure how long she was unconscious for.  States she got up after and was fine.  States that she went to urgent care for evaluation but she has had nasal congestion was found positive for COVID.  Endorsed some decreased p.o. intake today secondary to her symptoms.    She denied any dizziness, lightheadedness or headache at any time.  States she has been ambulating with no difficulty.  Denies any chest pain, shortness of breath, abdominal pain, nausea vomiting diarrhea.  Denies any recent travel or sick contacts at home as she lives alone.  patient states has had syncope events in the past but negative workup      Hospital Course:    2019 novel coronavirus disease (COVID-19).  positive COVID-19 with Viral syndrome, no respiratory component   only mild nasal congestion, occasional sneezing reported   not a candidate for Remdesivir per hospital protocol  not a candidate for Decadron per hospital protocol    [place] of hospital course:  Patient was seen and evaluated and followed by multiple specialists throughout the stay and treated medically with improvement.  Patient was otherwise stable and had no other complications. See chart for further details.  Patient was discharged to home in stable condition to follow up with primary doctor as outpatient for follow up and further care.    On 11/1/23 this case was reviewed with Dr. Thomas, the patient is medically stable and optimized for discharge. All medications were reviewed and prescriptions were sent to mutually agreed upon pharmacy.

## 2023-11-01 ENCOUNTER — TRANSCRIPTION ENCOUNTER (OUTPATIENT)
Age: 88
End: 2023-11-01

## 2023-11-01 VITALS
DIASTOLIC BLOOD PRESSURE: 67 MMHG | SYSTOLIC BLOOD PRESSURE: 129 MMHG | HEART RATE: 75 BPM | OXYGEN SATURATION: 100 % | RESPIRATION RATE: 17 BRPM | TEMPERATURE: 99 F

## 2023-11-01 LAB
ANION GAP SERPL CALC-SCNC: 14 MMOL/L — SIGNIFICANT CHANGE UP (ref 7–14)
ANION GAP SERPL CALC-SCNC: 14 MMOL/L — SIGNIFICANT CHANGE UP (ref 7–14)
BUN SERPL-MCNC: 21 MG/DL — SIGNIFICANT CHANGE UP (ref 7–23)
BUN SERPL-MCNC: 21 MG/DL — SIGNIFICANT CHANGE UP (ref 7–23)
CALCIUM SERPL-MCNC: 8.9 MG/DL — SIGNIFICANT CHANGE UP (ref 8.4–10.5)
CALCIUM SERPL-MCNC: 8.9 MG/DL — SIGNIFICANT CHANGE UP (ref 8.4–10.5)
CHLORIDE SERPL-SCNC: 103 MMOL/L — SIGNIFICANT CHANGE UP (ref 98–107)
CHLORIDE SERPL-SCNC: 103 MMOL/L — SIGNIFICANT CHANGE UP (ref 98–107)
CO2 SERPL-SCNC: 22 MMOL/L — SIGNIFICANT CHANGE UP (ref 22–31)
CO2 SERPL-SCNC: 22 MMOL/L — SIGNIFICANT CHANGE UP (ref 22–31)
CREAT SERPL-MCNC: 0.69 MG/DL — SIGNIFICANT CHANGE UP (ref 0.5–1.3)
CREAT SERPL-MCNC: 0.69 MG/DL — SIGNIFICANT CHANGE UP (ref 0.5–1.3)
EGFR: 84 ML/MIN/1.73M2 — SIGNIFICANT CHANGE UP
EGFR: 84 ML/MIN/1.73M2 — SIGNIFICANT CHANGE UP
GLUCOSE SERPL-MCNC: 103 MG/DL — HIGH (ref 70–99)
GLUCOSE SERPL-MCNC: 103 MG/DL — HIGH (ref 70–99)
HCT VFR BLD CALC: 41.1 % — SIGNIFICANT CHANGE UP (ref 34.5–45)
HCT VFR BLD CALC: 41.1 % — SIGNIFICANT CHANGE UP (ref 34.5–45)
HGB BLD-MCNC: 13.6 G/DL — SIGNIFICANT CHANGE UP (ref 11.5–15.5)
HGB BLD-MCNC: 13.6 G/DL — SIGNIFICANT CHANGE UP (ref 11.5–15.5)
MAGNESIUM SERPL-MCNC: 2.2 MG/DL — SIGNIFICANT CHANGE UP (ref 1.6–2.6)
MAGNESIUM SERPL-MCNC: 2.2 MG/DL — SIGNIFICANT CHANGE UP (ref 1.6–2.6)
MCHC RBC-ENTMCNC: 31 PG — SIGNIFICANT CHANGE UP (ref 27–34)
MCHC RBC-ENTMCNC: 31 PG — SIGNIFICANT CHANGE UP (ref 27–34)
MCHC RBC-ENTMCNC: 33.1 GM/DL — SIGNIFICANT CHANGE UP (ref 32–36)
MCHC RBC-ENTMCNC: 33.1 GM/DL — SIGNIFICANT CHANGE UP (ref 32–36)
MCV RBC AUTO: 93.6 FL — SIGNIFICANT CHANGE UP (ref 80–100)
MCV RBC AUTO: 93.6 FL — SIGNIFICANT CHANGE UP (ref 80–100)
NRBC # BLD: 0 /100 WBCS — SIGNIFICANT CHANGE UP (ref 0–0)
NRBC # BLD: 0 /100 WBCS — SIGNIFICANT CHANGE UP (ref 0–0)
NRBC # FLD: 0 K/UL — SIGNIFICANT CHANGE UP (ref 0–0)
NRBC # FLD: 0 K/UL — SIGNIFICANT CHANGE UP (ref 0–0)
PHOSPHATE SERPL-MCNC: 2.9 MG/DL — SIGNIFICANT CHANGE UP (ref 2.5–4.5)
PHOSPHATE SERPL-MCNC: 2.9 MG/DL — SIGNIFICANT CHANGE UP (ref 2.5–4.5)
PLATELET # BLD AUTO: 193 K/UL — SIGNIFICANT CHANGE UP (ref 150–400)
PLATELET # BLD AUTO: 193 K/UL — SIGNIFICANT CHANGE UP (ref 150–400)
POTASSIUM SERPL-MCNC: 3.7 MMOL/L — SIGNIFICANT CHANGE UP (ref 3.5–5.3)
POTASSIUM SERPL-MCNC: 3.7 MMOL/L — SIGNIFICANT CHANGE UP (ref 3.5–5.3)
POTASSIUM SERPL-SCNC: 3.7 MMOL/L — SIGNIFICANT CHANGE UP (ref 3.5–5.3)
POTASSIUM SERPL-SCNC: 3.7 MMOL/L — SIGNIFICANT CHANGE UP (ref 3.5–5.3)
RBC # BLD: 4.39 M/UL — SIGNIFICANT CHANGE UP (ref 3.8–5.2)
RBC # BLD: 4.39 M/UL — SIGNIFICANT CHANGE UP (ref 3.8–5.2)
RBC # FLD: 12.4 % — SIGNIFICANT CHANGE UP (ref 10.3–14.5)
RBC # FLD: 12.4 % — SIGNIFICANT CHANGE UP (ref 10.3–14.5)
SODIUM SERPL-SCNC: 139 MMOL/L — SIGNIFICANT CHANGE UP (ref 135–145)
SODIUM SERPL-SCNC: 139 MMOL/L — SIGNIFICANT CHANGE UP (ref 135–145)
WBC # BLD: 6.38 K/UL — SIGNIFICANT CHANGE UP (ref 3.8–10.5)
WBC # BLD: 6.38 K/UL — SIGNIFICANT CHANGE UP (ref 3.8–10.5)
WBC # FLD AUTO: 6.38 K/UL — SIGNIFICANT CHANGE UP (ref 3.8–10.5)
WBC # FLD AUTO: 6.38 K/UL — SIGNIFICANT CHANGE UP (ref 3.8–10.5)

## 2023-11-01 PROCEDURE — 74177 CT ABD & PELVIS W/CONTRAST: CPT | Mod: 26

## 2023-11-01 RX ORDER — CHOLECALCIFEROL (VITAMIN D3) 125 MCG
1 CAPSULE ORAL
Qty: 30 | Refills: 0
Start: 2023-11-01 | End: 2023-11-30

## 2023-11-01 RX ORDER — ASCORBIC ACID 60 MG
1 TABLET,CHEWABLE ORAL
Qty: 30 | Refills: 0
Start: 2023-11-01 | End: 2023-11-30

## 2023-11-01 RX ADMIN — LOSARTAN POTASSIUM 100 MILLIGRAM(S): 100 TABLET, FILM COATED ORAL at 05:30

## 2023-11-01 RX ADMIN — Medication 500 MILLIGRAM(S): at 12:08

## 2023-11-01 RX ADMIN — Medication 2000 UNIT(S): at 12:07

## 2023-11-01 RX ADMIN — ENOXAPARIN SODIUM 40 MILLIGRAM(S): 100 INJECTION SUBCUTANEOUS at 05:30

## 2023-11-01 RX ADMIN — FAMOTIDINE 20 MILLIGRAM(S): 10 INJECTION INTRAVENOUS at 12:07

## 2023-11-01 RX ADMIN — Medication 1 TABLET(S): at 12:08

## 2023-11-01 RX ADMIN — ZINC SULFATE TAB 220 MG (50 MG ZINC EQUIVALENT) 220 MILLIGRAM(S): 220 (50 ZN) TAB at 12:07

## 2023-11-01 RX ADMIN — Medication 30 MILLIGRAM(S): at 05:30

## 2023-11-01 NOTE — DISCHARGE NOTE NURSING/CASE MANAGEMENT/SOCIAL WORK - PATIENT PORTAL LINK FT
You can access the FollowMyHealth Patient Portal offered by [facility] by registering at the following website: [email or web address]. By joining [employer]’s FollowMyHealth portal, you will also be able to view your health information using other applications (apps) compatible with our system.

## 2023-11-01 NOTE — PROGRESS NOTE ADULT - ASSESSMENT
M E D I C A L   A T T E N D I N G    F O L L O W    U P   N O T E  (11-01-23 )                                     ------------------------------------------------------------------------------------------------    patient evaluated by me, case discussed with team, chart, medications, and physical exam reviewed, labs / tests  and vitals reviewed by me, as bellow.   Patient is stable for discharge today.   Patient to follow up with  PMD / cardio..   See discharge document for full note.  [Greater than 35 min spent for these services. ]          ( Note written / Date of service 11-01-23 ( This is certified to be the same as "ENTERED" date above ( for billing purposes)))    ==================>> MEDICATIONS <<====================    ascorbic acid 500 milliGRAM(s) Oral daily  cholecalciferol 2000 Unit(s) Oral daily  enoxaparin Injectable 40 milliGRAM(s) SubCutaneous every 24 hours  famotidine    Tablet 20 milliGRAM(s) Oral daily  losartan 100 milliGRAM(s) Oral daily  multivitamin 1 Tablet(s) Oral daily  NIFEdipine XL 30 milliGRAM(s) Oral daily  simvastatin 10 milliGRAM(s) Oral at bedtime  zinc sulfate 220 milliGRAM(s) Oral daily    MEDICATIONS  (PRN):  acetaminophen     Tablet .. 650 milliGRAM(s) Oral every 6 hours PRN Temp greater or equal to 38C (100.4F), Mild Pain (1 - 3)  aluminum hydroxide/magnesium hydroxide/simethicone Suspension 30 milliLiter(s) Oral every 4 hours PRN Dyspepsia  melatonin 3 milliGRAM(s) Oral at bedtime PRN Insomnia  ondansetron Injectable 4 milliGRAM(s) IV Push every 8 hours PRN Nausea and/or Vomiting    ___________  Active diet:  Diet, DASH/TLC:   Sodium & Cholesterol Restricted  ___________________    ==================>> VITAL SIGNS <<==================    T(C): 37.1 (11-01-23 @ 11:12), Max: 37.1 (10-31-23 @ 18:20)  HR: 75 (11-01-23 @ 11:12) (66 - 81)  BP: 129/67 (11-01-23 @ 11:12) (129/67 - 146/82)  BP(mean): --  RR: 17 (11-01-23 @ 11:12) (16 - 18)  SpO2: 100% (11-01-23 @ 11:12) (97% - 100%)       I&O's Summary    31 Oct 2023 07:01  -  01 Nov 2023 07:00  --------------------------------------------------------  IN: 240 mL / OUT: 0 mL / NET: 240 mL       ==================>> LAB AND IMAGING <<==================                        13.6   6.38  )-----------( 193      ( 01 Nov 2023 06:10 )             41.1        11-01    139  |  103  |  21  ----------------------------<  103<H>  3.7   |  22  |  0.69    Ca    8.9      01 Nov 2023 06:10  Phos  2.9     11-01  Mg     2.20     11-01    TPro  6.4  /  Alb  3.6  /  TBili  0.5  /  DBili  x   /  AST  12  /  ALT  9   /  AlkPhos  63  10-31               Urinalysis Basic - ( 01 Nov 2023 06:10 )  Color: x / Appearance: x / SG: x / pH: x  Gluc: 103 mg/dL / Ketone: x  / Bili: x / Urobili: x   Blood: x / Protein: x / Nitrite: x   Leuk Esterase: x / RBC: x / WBC x   Sq Epi: x / Non Sq Epi: x / Bacteria: x    Lipid profile:  (10-30-23)     Total: 118     LDL  : (p)     HDL  :49     TG   :96     WBC count:   6.38 <<== ,  5.49 <<== ,  6.74 <<== ,  8.41 <<==   Hemoglobin:   13.6 <<==,  13.8 <<==,  13.9 <<==,  14.0 <<==  platelets:  193 <==, 193 <==, 181 <==, 193 <==    Creatinine:  0.69  <<==, 0.70  <<==, 0.66  <<==, 0.65  <<==  Sodium:   139  <==, 142  <==, 138  <==, 137  <==       AST:          12 <== , 13 <== , 14 <==      ALT:        9  <== , 10  <== , 10  <==      AP:        63  <=, 66  <=, 76  <=     Bili:        0.5  <=, 0.4  <=, 0.5  <=    < from: CT Abdomen and Pelvis w/ IV Cont (11.01.23 @ 10:02) >  FINDINGS:  LOWER CHEST: Right basilar atelectasis.    LIVER: Liver cysts, largest measuring 2.9 cm in the right hepatic lobe.   Subcentimeter hypodensities too small to characterize. No suspicious   liver lesions.  BILE DUCTS: Normal caliber.  GALLBLADDER: Cholelithiasis.  SPLEEN: Within normal limits.  PANCREAS: Within normal limits.  ADRENALS: Within normal limits.  KIDNEYS/URETERS: Large exophytic right lower pole renal cyst measures   14.8 cm. Additional right renal cyst measures 4.4 cm. Indeterminate   lesion in the upper pole the right kidney measuring 1.7 cm, unchanged. No   hydronephrosis.    BLADDER: Within normal limits.  REPRODUCTIVE ORGANS: Hysterectomy.    BOWEL: No bowel obstruction. Diverticulosis. Moderate hiatal hernia.  Appendix is normal.  PERITONEUM: No ascites.  VESSELS: Vascular calcifications.  RETROPERITONEUM/LYMPH NODES: No lymphadenopathy.  ABDOMINAL WALL: Within normal limits.  BONES: Degenerative changes.    IMPRESSION:  *  Liver and renal cysts.  *  Stable indeterminate 1.7 cm lesion in the right kidney.    < end of copied text >        ( Note written / Date of service 11-01-23 ( This is certified to be the same as "ENTERED" date above ( for billing Purposes )))                                               M E D I C A L   A T T E N D I N G    F O L L O W    U P   N O T E  (11-01-23 )                                     ------------------------------------------------------------------------------------------------    patient evaluated by me, case discussed with team, chart, medications, and physical exam reviewed, labs / tests  and vitals reviewed by me, as bellow.   Patient is stable for discharge today.   Patient to follow up with  PMD / cardio..   See discharge document for full note.  [Greater than 35 min spent for these services. ]          ( Note written / Date of service 11-01-23 ( This is certified to be the same as "ENTERED" date above ( for billing purposes)))    ==================>> MEDICATIONS <<====================    ascorbic acid 500 milliGRAM(s) Oral daily  cholecalciferol 2000 Unit(s) Oral daily  enoxaparin Injectable 40 milliGRAM(s) SubCutaneous every 24 hours  famotidine    Tablet 20 milliGRAM(s) Oral daily  losartan 100 milliGRAM(s) Oral daily  multivitamin 1 Tablet(s) Oral daily  NIFEdipine XL 30 milliGRAM(s) Oral daily  simvastatin 10 milliGRAM(s) Oral at bedtime  zinc sulfate 220 milliGRAM(s) Oral daily    MEDICATIONS  (PRN):  acetaminophen     Tablet .. 650 milliGRAM(s) Oral every 6 hours PRN Temp greater or equal to 38C (100.4F), Mild Pain (1 - 3)  aluminum hydroxide/magnesium hydroxide/simethicone Suspension 30 milliLiter(s) Oral every 4 hours PRN Dyspepsia  melatonin 3 milliGRAM(s) Oral at bedtime PRN Insomnia  ondansetron Injectable 4 milliGRAM(s) IV Push every 8 hours PRN Nausea and/or Vomiting    ___________  Active diet:  Diet, DASH/TLC:   Sodium & Cholesterol Restricted  ___________________    ==================>> VITAL SIGNS <<==================    T(C): 37.1 (11-01-23 @ 11:12), Max: 37.1 (10-31-23 @ 18:20)  HR: 75 (11-01-23 @ 11:12) (66 - 81)  BP: 129/67 (11-01-23 @ 11:12) (129/67 - 146/82)  BP(mean): --  RR: 17 (11-01-23 @ 11:12) (16 - 18)  SpO2: 100% (11-01-23 @ 11:12) (97% - 100%)       I&O's Summary    31 Oct 2023 07:01  -  01 Nov 2023 07:00  --------------------------------------------------------  IN: 240 mL / OUT: 0 mL / NET: 240 mL       ==================>> LAB AND IMAGING <<==================                        13.6   6.38  )-----------( 193      ( 01 Nov 2023 06:10 )             41.1        11-01    139  |  103  |  21  ----------------------------<  103<H>  3.7   |  22  |  0.69    Ca    8.9      01 Nov 2023 06:10  Phos  2.9     11-01  Mg     2.20     11-01    TPro  6.4  /  Alb  3.6  /  TBili  0.5  /  DBili  x   /  AST  12  /  ALT  9   /  AlkPhos  63  10-31               Urinalysis Basic - ( 01 Nov 2023 06:10 )  Color: x / Appearance: x / SG: x / pH: x  Gluc: 103 mg/dL / Ketone: x  / Bili: x / Urobili: x   Blood: x / Protein: x / Nitrite: x   Leuk Esterase: x / RBC: x / WBC x   Sq Epi: x / Non Sq Epi: x / Bacteria: x    Lipid profile:  (10-30-23)     Total: 118     LDL  : (p)     HDL  :49     TG   :96     WBC count:   6.38 <<== ,  5.49 <<== ,  6.74 <<== ,  8.41 <<==   Hemoglobin:   13.6 <<==,  13.8 <<==,  13.9 <<==,  14.0 <<==  platelets:  193 <==, 193 <==, 181 <==, 193 <==    Creatinine:  0.69  <<==, 0.70  <<==, 0.66  <<==, 0.65  <<==  Sodium:   139  <==, 142  <==, 138  <==, 137  <==       AST:          12 <== , 13 <== , 14 <==      ALT:        9  <== , 10  <== , 10  <==      AP:        63  <=, 66  <=, 76  <=     Bili:        0.5  <=, 0.4  <=, 0.5  <=    < from: CT Abdomen and Pelvis w/ IV Cont (11.01.23 @ 10:02) >  FINDINGS:  LOWER CHEST: Right basilar atelectasis.    LIVER: Liver cysts, largest measuring 2.9 cm in the right hepatic lobe.   Subcentimeter hypodensities too small to characterize. No suspicious   liver lesions.  BILE DUCTS: Normal caliber.  GALLBLADDER: Cholelithiasis.  SPLEEN: Within normal limits.  PANCREAS: Within normal limits.  ADRENALS: Within normal limits.  KIDNEYS/URETERS: Large exophytic right lower pole renal cyst measures   14.8 cm. Additional right renal cyst measures 4.4 cm. Indeterminate   lesion in the upper pole the right kidney measuring 1.7 cm, unchanged. No   hydronephrosis.    BLADDER: Within normal limits.  REPRODUCTIVE ORGANS: Hysterectomy.    BOWEL: No bowel obstruction. Diverticulosis. Moderate hiatal hernia.  Appendix is normal.  PERITONEUM: No ascites.  VESSELS: Vascular calcifications.  RETROPERITONEUM/LYMPH NODES: No lymphadenopathy.  ABDOMINAL WALL: Within normal limits.  BONES: Degenerative changes.    IMPRESSION:  *  Liver and renal cysts.  *  Stable indeterminate 1.7 cm lesion in the right kidney.  < end of copied text >    < from: TTE W or WO Ultrasound Enhancing Agent (10.31.23 @ 16:00) >  CONCLUSIONS:   1. Left ventricular cavity is normal. Left ventricular wall thickness is normal. Left ventricular systolic function is normal with an ejection fraction of 66 % by Harman's method of disks. There are no regional wall motion abnormalities seen.   2. There is mild (grade 1) left ventricular diastolic dysfunction.   3. Normal right ventricular cavity size and systolic function.   4. Structurally normal mitral valve with normal leaflet excursion. There is calcification of the mitral valve annulus. There is trace mitral regurgitation.   5. A large (~ 8.8 cm X 6.5 cm) hepatic cyst is visualized. Consider dedicated abdominal imaging for further evaluation, if clinically indicated.  < end of copied text >        ( Note written / Date of service 11-01-23 ( This is certified to be the same as "ENTERED" date above ( for billing Purposes )))

## 2023-11-01 NOTE — DISCHARGE NOTE NURSING/CASE MANAGEMENT/SOCIAL WORK - NSDCPEFALRISK_GEN_ALL_CORE
For information on Fall & Injury Prevention, visit: [email or web address] OR  [email or web address] OR  https://www.cdc.gov/steadi/patient.html

## 2023-11-24 ENCOUNTER — INPATIENT (INPATIENT)
Facility: HOSPITAL | Age: 88
LOS: 9 days | Discharge: NURSING, REHAB OR LONG-TERM CARE | End: 2023-12-04
Attending: INTERNAL MEDICINE | Admitting: STUDENT IN AN ORGANIZED HEALTH CARE EDUCATION/TRAINING PROGRAM
Payer: MEDICARE

## 2023-11-24 VITALS
DIASTOLIC BLOOD PRESSURE: 82 MMHG | HEIGHT: 64 IN | WEIGHT: 139.99 LBS | OXYGEN SATURATION: 94 % | TEMPERATURE: 98 F | RESPIRATION RATE: 15 BRPM | SYSTOLIC BLOOD PRESSURE: 120 MMHG | HEART RATE: 82 BPM

## 2023-11-24 DIAGNOSIS — Z01.818 ENCOUNTER FOR OTHER PREPROCEDURAL EXAMINATION: Chronic | ICD-10-CM

## 2023-11-24 DIAGNOSIS — S82.899A OTHER FRACTURE OF UNSPECIFIED LOWER LEG, INITIAL ENCOUNTER FOR CLOSED FRACTURE: Chronic | ICD-10-CM

## 2023-11-24 LAB
ALBUMIN SERPL ELPH-MCNC: 3.3 G/DL — SIGNIFICANT CHANGE UP (ref 3.3–5)
ALBUMIN SERPL ELPH-MCNC: 3.3 G/DL — SIGNIFICANT CHANGE UP (ref 3.3–5)
ALP SERPL-CCNC: 83 U/L — SIGNIFICANT CHANGE UP (ref 40–120)
ALP SERPL-CCNC: 83 U/L — SIGNIFICANT CHANGE UP (ref 40–120)
ALT FLD-CCNC: 19 U/L — SIGNIFICANT CHANGE UP (ref 12–78)
ALT FLD-CCNC: 19 U/L — SIGNIFICANT CHANGE UP (ref 12–78)
ANION GAP SERPL CALC-SCNC: 2 MMOL/L — LOW (ref 5–17)
ANION GAP SERPL CALC-SCNC: 2 MMOL/L — LOW (ref 5–17)
APTT BLD: 28.1 SEC — SIGNIFICANT CHANGE UP (ref 24.5–35.6)
APTT BLD: 28.1 SEC — SIGNIFICANT CHANGE UP (ref 24.5–35.6)
AST SERPL-CCNC: 12 U/L — LOW (ref 15–37)
AST SERPL-CCNC: 12 U/L — LOW (ref 15–37)
BASOPHILS # BLD AUTO: 0.05 K/UL — SIGNIFICANT CHANGE UP (ref 0–0.2)
BASOPHILS # BLD AUTO: 0.05 K/UL — SIGNIFICANT CHANGE UP (ref 0–0.2)
BASOPHILS NFR BLD AUTO: 0.6 % — SIGNIFICANT CHANGE UP (ref 0–2)
BASOPHILS NFR BLD AUTO: 0.6 % — SIGNIFICANT CHANGE UP (ref 0–2)
BILIRUB SERPL-MCNC: 0.3 MG/DL — SIGNIFICANT CHANGE UP (ref 0.2–1.2)
BILIRUB SERPL-MCNC: 0.3 MG/DL — SIGNIFICANT CHANGE UP (ref 0.2–1.2)
BLD GP AB SCN SERPL QL: SIGNIFICANT CHANGE UP
BLD GP AB SCN SERPL QL: SIGNIFICANT CHANGE UP
BUN SERPL-MCNC: 26 MG/DL — HIGH (ref 7–23)
BUN SERPL-MCNC: 26 MG/DL — HIGH (ref 7–23)
CALCIUM SERPL-MCNC: 9 MG/DL — SIGNIFICANT CHANGE UP (ref 8.5–10.1)
CALCIUM SERPL-MCNC: 9 MG/DL — SIGNIFICANT CHANGE UP (ref 8.5–10.1)
CHLORIDE SERPL-SCNC: 107 MMOL/L — SIGNIFICANT CHANGE UP (ref 96–108)
CHLORIDE SERPL-SCNC: 107 MMOL/L — SIGNIFICANT CHANGE UP (ref 96–108)
CO2 SERPL-SCNC: 30 MMOL/L — SIGNIFICANT CHANGE UP (ref 22–31)
CO2 SERPL-SCNC: 30 MMOL/L — SIGNIFICANT CHANGE UP (ref 22–31)
CREAT SERPL-MCNC: 0.85 MG/DL — SIGNIFICANT CHANGE UP (ref 0.5–1.3)
CREAT SERPL-MCNC: 0.85 MG/DL — SIGNIFICANT CHANGE UP (ref 0.5–1.3)
EGFR: 66 ML/MIN/1.73M2 — SIGNIFICANT CHANGE UP
EGFR: 66 ML/MIN/1.73M2 — SIGNIFICANT CHANGE UP
EOSINOPHIL # BLD AUTO: 0.18 K/UL — SIGNIFICANT CHANGE UP (ref 0–0.5)
EOSINOPHIL # BLD AUTO: 0.18 K/UL — SIGNIFICANT CHANGE UP (ref 0–0.5)
EOSINOPHIL NFR BLD AUTO: 2 % — SIGNIFICANT CHANGE UP (ref 0–6)
EOSINOPHIL NFR BLD AUTO: 2 % — SIGNIFICANT CHANGE UP (ref 0–6)
GLUCOSE SERPL-MCNC: 159 MG/DL — HIGH (ref 70–99)
GLUCOSE SERPL-MCNC: 159 MG/DL — HIGH (ref 70–99)
HCT VFR BLD CALC: 40.9 % — SIGNIFICANT CHANGE UP (ref 34.5–45)
HCT VFR BLD CALC: 40.9 % — SIGNIFICANT CHANGE UP (ref 34.5–45)
HGB BLD-MCNC: 13.5 G/DL — SIGNIFICANT CHANGE UP (ref 11.5–15.5)
HGB BLD-MCNC: 13.5 G/DL — SIGNIFICANT CHANGE UP (ref 11.5–15.5)
IMM GRANULOCYTES NFR BLD AUTO: 0.4 % — SIGNIFICANT CHANGE UP (ref 0–0.9)
IMM GRANULOCYTES NFR BLD AUTO: 0.4 % — SIGNIFICANT CHANGE UP (ref 0–0.9)
INR BLD: 0.9 RATIO — SIGNIFICANT CHANGE UP (ref 0.85–1.18)
INR BLD: 0.9 RATIO — SIGNIFICANT CHANGE UP (ref 0.85–1.18)
LYMPHOCYTES # BLD AUTO: 2.33 K/UL — SIGNIFICANT CHANGE UP (ref 1–3.3)
LYMPHOCYTES # BLD AUTO: 2.33 K/UL — SIGNIFICANT CHANGE UP (ref 1–3.3)
LYMPHOCYTES # BLD AUTO: 26 % — SIGNIFICANT CHANGE UP (ref 13–44)
LYMPHOCYTES # BLD AUTO: 26 % — SIGNIFICANT CHANGE UP (ref 13–44)
MAGNESIUM SERPL-MCNC: 2.3 MG/DL — SIGNIFICANT CHANGE UP (ref 1.6–2.6)
MAGNESIUM SERPL-MCNC: 2.3 MG/DL — SIGNIFICANT CHANGE UP (ref 1.6–2.6)
MCHC RBC-ENTMCNC: 31.7 PG — SIGNIFICANT CHANGE UP (ref 27–34)
MCHC RBC-ENTMCNC: 31.7 PG — SIGNIFICANT CHANGE UP (ref 27–34)
MCHC RBC-ENTMCNC: 33 G/DL — SIGNIFICANT CHANGE UP (ref 32–36)
MCHC RBC-ENTMCNC: 33 G/DL — SIGNIFICANT CHANGE UP (ref 32–36)
MCV RBC AUTO: 96 FL — SIGNIFICANT CHANGE UP (ref 80–100)
MCV RBC AUTO: 96 FL — SIGNIFICANT CHANGE UP (ref 80–100)
MONOCYTES # BLD AUTO: 0.81 K/UL — SIGNIFICANT CHANGE UP (ref 0–0.9)
MONOCYTES # BLD AUTO: 0.81 K/UL — SIGNIFICANT CHANGE UP (ref 0–0.9)
MONOCYTES NFR BLD AUTO: 9.1 % — SIGNIFICANT CHANGE UP (ref 2–14)
MONOCYTES NFR BLD AUTO: 9.1 % — SIGNIFICANT CHANGE UP (ref 2–14)
NEUTROPHILS # BLD AUTO: 5.54 K/UL — SIGNIFICANT CHANGE UP (ref 1.8–7.4)
NEUTROPHILS # BLD AUTO: 5.54 K/UL — SIGNIFICANT CHANGE UP (ref 1.8–7.4)
NEUTROPHILS NFR BLD AUTO: 61.9 % — SIGNIFICANT CHANGE UP (ref 43–77)
NEUTROPHILS NFR BLD AUTO: 61.9 % — SIGNIFICANT CHANGE UP (ref 43–77)
NRBC # BLD: 0 /100 WBCS — SIGNIFICANT CHANGE UP (ref 0–0)
NRBC # BLD: 0 /100 WBCS — SIGNIFICANT CHANGE UP (ref 0–0)
PLATELET # BLD AUTO: 208 K/UL — SIGNIFICANT CHANGE UP (ref 150–400)
PLATELET # BLD AUTO: 208 K/UL — SIGNIFICANT CHANGE UP (ref 150–400)
POTASSIUM SERPL-MCNC: 3.9 MMOL/L — SIGNIFICANT CHANGE UP (ref 3.5–5.3)
POTASSIUM SERPL-MCNC: 3.9 MMOL/L — SIGNIFICANT CHANGE UP (ref 3.5–5.3)
POTASSIUM SERPL-SCNC: 3.9 MMOL/L — SIGNIFICANT CHANGE UP (ref 3.5–5.3)
POTASSIUM SERPL-SCNC: 3.9 MMOL/L — SIGNIFICANT CHANGE UP (ref 3.5–5.3)
PROT SERPL-MCNC: 6.9 GM/DL — SIGNIFICANT CHANGE UP (ref 6–8.3)
PROT SERPL-MCNC: 6.9 GM/DL — SIGNIFICANT CHANGE UP (ref 6–8.3)
PROTHROM AB SERPL-ACNC: 10.8 SEC — SIGNIFICANT CHANGE UP (ref 9.5–13)
PROTHROM AB SERPL-ACNC: 10.8 SEC — SIGNIFICANT CHANGE UP (ref 9.5–13)
RBC # BLD: 4.26 M/UL — SIGNIFICANT CHANGE UP (ref 3.8–5.2)
RBC # BLD: 4.26 M/UL — SIGNIFICANT CHANGE UP (ref 3.8–5.2)
RBC # FLD: 13.2 % — SIGNIFICANT CHANGE UP (ref 10.3–14.5)
RBC # FLD: 13.2 % — SIGNIFICANT CHANGE UP (ref 10.3–14.5)
SODIUM SERPL-SCNC: 139 MMOL/L — SIGNIFICANT CHANGE UP (ref 135–145)
SODIUM SERPL-SCNC: 139 MMOL/L — SIGNIFICANT CHANGE UP (ref 135–145)
WBC # BLD: 8.95 K/UL — SIGNIFICANT CHANGE UP (ref 3.8–10.5)
WBC # BLD: 8.95 K/UL — SIGNIFICANT CHANGE UP (ref 3.8–10.5)
WBC # FLD AUTO: 8.95 K/UL — SIGNIFICANT CHANGE UP (ref 3.8–10.5)
WBC # FLD AUTO: 8.95 K/UL — SIGNIFICANT CHANGE UP (ref 3.8–10.5)

## 2023-11-24 PROCEDURE — 73502 X-RAY EXAM HIP UNI 2-3 VIEWS: CPT | Mod: 26,LT

## 2023-11-24 PROCEDURE — 99285 EMERGENCY DEPT VISIT HI MDM: CPT

## 2023-11-24 PROCEDURE — 73700 CT LOWER EXTREMITY W/O DYE: CPT | Mod: 26,LT,MA

## 2023-11-24 PROCEDURE — 73552 X-RAY EXAM OF FEMUR 2/>: CPT | Mod: 26,LT

## 2023-11-24 PROCEDURE — 73590 X-RAY EXAM OF LOWER LEG: CPT | Mod: 26,LT

## 2023-11-24 PROCEDURE — 71045 X-RAY EXAM CHEST 1 VIEW: CPT | Mod: 26

## 2023-11-24 PROCEDURE — 73564 X-RAY EXAM KNEE 4 OR MORE: CPT | Mod: 26,LT

## 2023-11-24 RX ORDER — ACETAMINOPHEN 500 MG
975 TABLET ORAL ONCE
Refills: 0 | Status: COMPLETED | OUTPATIENT
Start: 2023-11-24 | End: 2023-11-24

## 2023-11-24 RX ORDER — KETOROLAC TROMETHAMINE 30 MG/ML
15 SYRINGE (ML) INJECTION ONCE
Refills: 0 | Status: DISCONTINUED | OUTPATIENT
Start: 2023-11-24 | End: 2023-11-24

## 2023-11-24 RX ADMIN — Medication 975 MILLIGRAM(S): at 22:11

## 2023-11-24 RX ADMIN — Medication 15 MILLIGRAM(S): at 22:10
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
## 2023-11-24 NOTE — ED PROVIDER NOTE - NSICDXPASTMEDICALHX_GEN_ALL_CORE_FT
PAST MEDICAL HISTORY:  Endometrial cancer     [place] (hard of hearing) left > right - no hearing aids    Hyperlipidemia     Hypertension
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
## 2023-11-24 NOTE — ED ADULT NURSE NOTE - NSICDXPASTMEDICALHX_GEN_ALL_CORE_FT
PAST MEDICAL HISTORY:  Endometrial cancer     [place] (hard of hearing) left > right - no hearing aids    Hyperlipidemia     Hypertension

## 2023-11-24 NOTE — ED PROVIDER NOTE - PHYSICAL EXAMINATION
Gen: Alert, NAD  Head: NC, AT   Eyes: PERRL, EOMI, normal lids/conjunctiva  ENT: normal hearing, patent oropharynx without erythema/exudate, uvula midline  Neck: supple, no tenderness, Trachea midline  Pulm: Bilateral BS, normal resp effort, no wheeze/stridor/retractions  CV: RRR, no M/R/G, 2+ radial and dp pulses bl, no edema  Abd: soft, NT/ND, +BS, no hepatosplenomegaly  Mskel: mild effusion of the left knee with inability to full straighten. no ctl spine ttp.   Skin: no rash, no bruising   Neuro: AAOx3, no sensory/motor deficits, CN 2-12 intact

## 2023-11-25 PROCEDURE — 99222 1ST HOSP IP/OBS MODERATE 55: CPT

## 2023-11-25 PROCEDURE — 99221 1ST HOSP IP/OBS SF/LOW 40: CPT

## 2023-11-25 RX ORDER — NIFEDIPINE 30 MG
30 TABLET, EXTENDED RELEASE 24 HR ORAL DAILY
Refills: 0 | Status: DISCONTINUED | OUTPATIENT
Start: 2023-11-25 | End: 2023-12-04

## 2023-11-25 RX ORDER — CHLORHEXIDINE GLUCONATE 213 G/1000ML
1 SOLUTION TOPICAL
Refills: 0 | Status: DISCONTINUED | OUTPATIENT
Start: 2023-11-25 | End: 2023-12-04

## 2023-11-25 RX ORDER — SIMVASTATIN 20 MG/1
10 TABLET, FILM COATED ORAL AT BEDTIME
Refills: 0 | Status: DISCONTINUED | OUTPATIENT
Start: 2023-11-25 | End: 2023-12-04

## 2023-11-25 RX ORDER — CHOLECALCIFEROL (VITAMIN D3) 125 MCG
1000 CAPSULE ORAL DAILY
Refills: 0 | Status: DISCONTINUED | OUTPATIENT
Start: 2023-11-25 | End: 2023-12-04

## 2023-11-25 RX ORDER — PANTOPRAZOLE SODIUM 20 MG/1
40 TABLET, DELAYED RELEASE ORAL
Refills: 0 | Status: DISCONTINUED | OUTPATIENT
Start: 2023-11-25 | End: 2023-12-04

## 2023-11-25 RX ORDER — LOSARTAN POTASSIUM 100 MG/1
100 TABLET, FILM COATED ORAL DAILY
Refills: 0 | Status: DISCONTINUED | OUTPATIENT
Start: 2023-11-25 | End: 2023-12-04

## 2023-11-25 RX ORDER — ASPIRIN/CALCIUM CARB/MAGNESIUM 324 MG
325 TABLET ORAL DAILY
Refills: 0 | Status: DISCONTINUED | OUTPATIENT
Start: 2023-11-25 | End: 2023-12-04

## 2023-11-25 RX ORDER — ACETAMINOPHEN 500 MG
650 TABLET ORAL EVERY 6 HOURS
Refills: 0 | Status: DISCONTINUED | OUTPATIENT
Start: 2023-11-25 | End: 2023-12-04

## 2023-11-25 RX ADMIN — Medication 30 MILLIGRAM(S): at 05:20

## 2023-11-25 RX ADMIN — PANTOPRAZOLE SODIUM 40 MILLIGRAM(S): 20 TABLET, DELAYED RELEASE ORAL at 05:20

## 2023-11-25 RX ADMIN — Medication 1 TABLET(S): at 11:14

## 2023-11-25 RX ADMIN — CHLORHEXIDINE GLUCONATE 1 APPLICATION(S): 213 SOLUTION TOPICAL at 05:21

## 2023-11-25 RX ADMIN — Medication 325 MILLIGRAM(S): at 11:14

## 2023-11-25 RX ADMIN — Medication 1000 UNIT(S): at 11:14

## 2023-11-25 RX ADMIN — LOSARTAN POTASSIUM 100 MILLIGRAM(S): 100 TABLET, FILM COATED ORAL at 05:20

## 2023-11-25 RX ADMIN — SIMVASTATIN 10 MILLIGRAM(S): 20 TABLET, FILM COATED ORAL at 21:44

## 2023-11-25 NOTE — PHYSICAL THERAPY INITIAL EVALUATION ADULT - ASSISTIVE DEVICE: SCOOT/BRIDGE, REHAB EVAL
Recent PHQ 2/9 Score    PHQ 2:  Date Adult PHQ 2 Score Adult PHQ 2 Interpretation   6/28/2021 0 No further screening needed       PHQ 9:     Health Maintenance Due   Topic Date Due   • COVID-19 Vaccine (1) Never done       Patient is due for topics as listed above but is not proceeding with Immunization(s) COVID-19 at this time. Education provided for Immunization(s) COVID-19.           bed rails
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
## 2023-11-25 NOTE — PHYSICAL THERAPY INITIAL EVALUATION ADULT - GENERAL OBSERVATIONS, REHAB EVAL
Chart review revealed NWB right lower limb by ED MD and FFWB by chivo RUBY- Therapist d/w ortho resident Dr Hyde pt's case- ortho re enforced FFWB- requested to update PT order  in [place].  Pt recd supine nad. +KI on ace wrap dressing R knee. + primafit. No c/o pain . Educated pt on FFWB status Chart review revealed NWB left lower limb by ED MD and FFWB by chivo RUBY- Therapist d/w ortho resident Dr Hyde pt's case- ortho re enforced FFWB- requested to update PT order  in [place].  Pt recd supine nad. +KI on ace wrap dressing L knee. + primafit. No c/o pain . Educated pt on FFWB status

## 2023-11-25 NOTE — CONSULT NOTE ADULT - ATTENDING COMMENTS
89yo left tibial plateau fx.  plan for nonop mgmt.  in knee immobilizer.  will transition to joseph brace.  will allow TTWB to enable mobilization with repeat xr in 1 week 89yo left tibial plateau fx.  plan for nonop mgmt.  in knee immobilizer.  will transition to joseph brace.  will allow FFWB to enable mobilization with repeat xr in 1 week
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
## 2023-11-25 NOTE — PHYSICAL THERAPY INITIAL EVALUATION ADULT - ACTIVE RANGE OF MOTION EXAMINATION, REHAB EVAL
R hip wfl R knee/ankle NT/bilateral upper extremity Active ROM was WFL (within functional limits)/Left LE Active ROM was WFL (within functional limits) L hip wfl L knee/ankle NT/bilateral upper extremity Active ROM was WFL (within functional limits)/Left LE Active ROM was WFL (within functional limits)

## 2023-11-25 NOTE — H&P ADULT - NSICDXPASTMEDICALHX_GEN_ALL_CORE_FT
PAST MEDICAL HISTORY:  Endometrial cancer     [place] (hard of hearing) left > right - no hearing aids    Hyperlipidemia     Hypertension

## 2023-11-25 NOTE — PHYSICAL THERAPY INITIAL EVALUATION ADULT - GAIT TRAINING, PT EVAL
Pt will be able to ambulate independently using assistive device up to 200 ft or more, be able to negotiate 6 steps safely observing proper gait, posture and prevent falls.

## 2023-11-25 NOTE — PHYSICAL THERAPY INITIAL EVALUATION ADULT - PERTINENT HX OF CURRENT PROBLEM, REHAB EVAL
Admitted for s/p fall   Xray review revealed open reduction internal fixation of R ankle- pt explained that this is from a surgery 50 years ago Admitted for s/p fall   Xray review revealed open reduction internal fixation of L ankle- pt explained that this is from a surgery 50 years ago

## 2023-11-25 NOTE — PHYSICAL THERAPY INITIAL EVALUATION ADULT - DID THE PATIENT HAVE SURGERY?
conservative management of R tibial plateau fx/n/a conservative management of L tibial plateau fx/n/a
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
## 2023-11-25 NOTE — H&P ADULT - NSHPPHYSICALEXAM_GEN_ALL_CORE
PHYSICAL EXAM:  GENERAL: NAD, lying in bed comfortably  HEAD:  Atraumatic, Normocephalic  EYES: EOMI, PERRLA, conjunctiva and sclera clear  ENT: Moist mucous membranes  NECK: Supple, No JVD  CHEST/LUNG: Clear to auscultation bilaterally; No rales, rhonchi, wheezing, or rubs. Unlabored respirations  HEART: Regular rate and rhythm; No murmurs, rubs, or gallops  ABDOMEN: Bowel sounds present; Soft, Nontender, Nondistended. No hepatomegaly  EXTREMITIES:  2+ Peripheral Pulses, brisk capillary refill. No clubbing, cyanosis, or edema  NERVOUS SYSTEM:  Alert & Oriented X3, speech clear. No deficits   MSK: FROM all 4 extremities, full and equal strength  SKIN: No rashes or lesions
Statement Selected

## 2023-11-25 NOTE — PHYSICAL THERAPY INITIAL EVALUATION ADULT - ADDITIONAL COMMENTS
Lives in [facility] elevator access apt . No stairs to negotiate. Sister ( 80 years old) helps .

## 2023-11-25 NOTE — H&P ADULT - NSHPLABSRESULTS_GEN_ALL_CORE
(11-24 @ 22:13)                      13.5  8.95 )-----------( 208                 40.9    Neutrophils = 5.54 (61.9%)  Lymphocytes = 2.33 (26.0%)  Eosinophils = 0.18 (2.0%)  Basophils = 0.05 (0.6%)  Monocytes = 0.81 (9.1%)  Bands = --%    11-24    139  |  107  |  26<H>  ----------------------------<  159<H>  3.9   |  30  |  0.85    Ca    9.0      24 Nov 2023 22:13  Mg     2.3     11-24    TPro  6.9  /  Alb  3.3  /  TBili  0.3  /  DBili  x   /  AST  12<L>  /  ALT  19  /  AlkPhos  83  11-24    ( 24 Nov 2023 22:13 )   PT: 10.8 sec;   INR: 0.90 ratio;       PTT:28.1 sec      RVP:          Tox:         Urinalysis Basic - ( 24 Nov 2023 22:13 )    Color: x / Appearance: x / SG: x / pH: x  Gluc: 159 mg/dL / Ketone: x  / Bili: x / Urobili: x   Blood: x / Protein: x / Nitrite: x   Leuk Esterase: x / RBC: x / WBC x   Sq Epi: x / Non Sq Epi: x / Bacteria: x

## 2023-11-26 LAB
ALBUMIN SERPL ELPH-MCNC: 2.9 G/DL — LOW (ref 3.3–5)
ALBUMIN SERPL ELPH-MCNC: 2.9 G/DL — LOW (ref 3.3–5)
ALP SERPL-CCNC: 70 U/L — SIGNIFICANT CHANGE UP (ref 40–120)
ALP SERPL-CCNC: 70 U/L — SIGNIFICANT CHANGE UP (ref 40–120)
ALT FLD-CCNC: 19 U/L — SIGNIFICANT CHANGE UP (ref 12–78)
ALT FLD-CCNC: 19 U/L — SIGNIFICANT CHANGE UP (ref 12–78)
ANION GAP SERPL CALC-SCNC: 5 MMOL/L — SIGNIFICANT CHANGE UP (ref 5–17)
ANION GAP SERPL CALC-SCNC: 5 MMOL/L — SIGNIFICANT CHANGE UP (ref 5–17)
AST SERPL-CCNC: 13 U/L — LOW (ref 15–37)
AST SERPL-CCNC: 13 U/L — LOW (ref 15–37)
BILIRUB SERPL-MCNC: 0.6 MG/DL — SIGNIFICANT CHANGE UP (ref 0.2–1.2)
BILIRUB SERPL-MCNC: 0.6 MG/DL — SIGNIFICANT CHANGE UP (ref 0.2–1.2)
BUN SERPL-MCNC: 16 MG/DL — SIGNIFICANT CHANGE UP (ref 7–23)
BUN SERPL-MCNC: 16 MG/DL — SIGNIFICANT CHANGE UP (ref 7–23)
CALCIUM SERPL-MCNC: 8.5 MG/DL — SIGNIFICANT CHANGE UP (ref 8.5–10.1)
CALCIUM SERPL-MCNC: 8.5 MG/DL — SIGNIFICANT CHANGE UP (ref 8.5–10.1)
CHLORIDE SERPL-SCNC: 108 MMOL/L — SIGNIFICANT CHANGE UP (ref 96–108)
CHLORIDE SERPL-SCNC: 108 MMOL/L — SIGNIFICANT CHANGE UP (ref 96–108)
CO2 SERPL-SCNC: 29 MMOL/L — SIGNIFICANT CHANGE UP (ref 22–31)
CO2 SERPL-SCNC: 29 MMOL/L — SIGNIFICANT CHANGE UP (ref 22–31)
CREAT SERPL-MCNC: 0.67 MG/DL — SIGNIFICANT CHANGE UP (ref 0.5–1.3)
CREAT SERPL-MCNC: 0.67 MG/DL — SIGNIFICANT CHANGE UP (ref 0.5–1.3)
EGFR: 85 ML/MIN/1.73M2 — SIGNIFICANT CHANGE UP
EGFR: 85 ML/MIN/1.73M2 — SIGNIFICANT CHANGE UP
GLUCOSE SERPL-MCNC: 122 MG/DL — HIGH (ref 70–99)
GLUCOSE SERPL-MCNC: 122 MG/DL — HIGH (ref 70–99)
HCT VFR BLD CALC: 38.9 % — SIGNIFICANT CHANGE UP (ref 34.5–45)
HCT VFR BLD CALC: 38.9 % — SIGNIFICANT CHANGE UP (ref 34.5–45)
HGB BLD-MCNC: 12.5 G/DL — SIGNIFICANT CHANGE UP (ref 11.5–15.5)
HGB BLD-MCNC: 12.5 G/DL — SIGNIFICANT CHANGE UP (ref 11.5–15.5)
MAGNESIUM SERPL-MCNC: 2.2 MG/DL — SIGNIFICANT CHANGE UP (ref 1.6–2.6)
MAGNESIUM SERPL-MCNC: 2.2 MG/DL — SIGNIFICANT CHANGE UP (ref 1.6–2.6)
MCHC RBC-ENTMCNC: 30.6 PG — SIGNIFICANT CHANGE UP (ref 27–34)
MCHC RBC-ENTMCNC: 30.6 PG — SIGNIFICANT CHANGE UP (ref 27–34)
MCHC RBC-ENTMCNC: 32.1 G/DL — SIGNIFICANT CHANGE UP (ref 32–36)
MCHC RBC-ENTMCNC: 32.1 G/DL — SIGNIFICANT CHANGE UP (ref 32–36)
MCV RBC AUTO: 95.3 FL — SIGNIFICANT CHANGE UP (ref 80–100)
MCV RBC AUTO: 95.3 FL — SIGNIFICANT CHANGE UP (ref 80–100)
NRBC # BLD: 0 /100 WBCS — SIGNIFICANT CHANGE UP (ref 0–0)
NRBC # BLD: 0 /100 WBCS — SIGNIFICANT CHANGE UP (ref 0–0)
PLATELET # BLD AUTO: 176 K/UL — SIGNIFICANT CHANGE UP (ref 150–400)
PLATELET # BLD AUTO: 176 K/UL — SIGNIFICANT CHANGE UP (ref 150–400)
POTASSIUM SERPL-MCNC: 3.5 MMOL/L — SIGNIFICANT CHANGE UP (ref 3.5–5.3)
POTASSIUM SERPL-MCNC: 3.5 MMOL/L — SIGNIFICANT CHANGE UP (ref 3.5–5.3)
POTASSIUM SERPL-SCNC: 3.5 MMOL/L — SIGNIFICANT CHANGE UP (ref 3.5–5.3)
POTASSIUM SERPL-SCNC: 3.5 MMOL/L — SIGNIFICANT CHANGE UP (ref 3.5–5.3)
PROT SERPL-MCNC: 6.3 GM/DL — SIGNIFICANT CHANGE UP (ref 6–8.3)
PROT SERPL-MCNC: 6.3 GM/DL — SIGNIFICANT CHANGE UP (ref 6–8.3)
RBC # BLD: 4.08 M/UL — SIGNIFICANT CHANGE UP (ref 3.8–5.2)
RBC # BLD: 4.08 M/UL — SIGNIFICANT CHANGE UP (ref 3.8–5.2)
RBC # FLD: 13.4 % — SIGNIFICANT CHANGE UP (ref 10.3–14.5)
RBC # FLD: 13.4 % — SIGNIFICANT CHANGE UP (ref 10.3–14.5)
SODIUM SERPL-SCNC: 142 MMOL/L — SIGNIFICANT CHANGE UP (ref 135–145)
SODIUM SERPL-SCNC: 142 MMOL/L — SIGNIFICANT CHANGE UP (ref 135–145)
WBC # BLD: 8.24 K/UL — SIGNIFICANT CHANGE UP (ref 3.8–10.5)
WBC # BLD: 8.24 K/UL — SIGNIFICANT CHANGE UP (ref 3.8–10.5)
WBC # FLD AUTO: 8.24 K/UL — SIGNIFICANT CHANGE UP (ref 3.8–10.5)
WBC # FLD AUTO: 8.24 K/UL — SIGNIFICANT CHANGE UP (ref 3.8–10.5)

## 2023-11-26 PROCEDURE — 99232 SBSQ HOSP IP/OBS MODERATE 35: CPT

## 2023-11-26 RX ORDER — INFLUENZA VIRUS VACCINE 15; 15; 15; 15 UG/.5ML; UG/.5ML; UG/.5ML; UG/.5ML
0.7 SUSPENSION INTRAMUSCULAR ONCE
Refills: 0 | Status: COMPLETED | OUTPATIENT
Start: 2023-11-26 | End: 2023-12-04

## 2023-11-26 RX ORDER — IBUPROFEN 200 MG
400 TABLET ORAL ONCE
Refills: 0 | Status: COMPLETED | OUTPATIENT
Start: 2023-11-26 | End: 2023-11-26

## 2023-11-26 RX ADMIN — Medication 650 MILLIGRAM(S): at 20:19

## 2023-11-26 RX ADMIN — Medication 650 MILLIGRAM(S): at 10:40

## 2023-11-26 RX ADMIN — PANTOPRAZOLE SODIUM 40 MILLIGRAM(S): 20 TABLET, DELAYED RELEASE ORAL at 05:34

## 2023-11-26 RX ADMIN — Medication 1 TABLET(S): at 11:38

## 2023-11-26 RX ADMIN — Medication 650 MILLIGRAM(S): at 11:40

## 2023-11-26 RX ADMIN — Medication 30 MILLIGRAM(S): at 05:34

## 2023-11-26 RX ADMIN — Medication 650 MILLIGRAM(S): at 19:19

## 2023-11-26 RX ADMIN — CHLORHEXIDINE GLUCONATE 1 APPLICATION(S): 213 SOLUTION TOPICAL at 05:34

## 2023-11-26 RX ADMIN — Medication 1000 UNIT(S): at 11:38

## 2023-11-26 RX ADMIN — LOSARTAN POTASSIUM 100 MILLIGRAM(S): 100 TABLET, FILM COATED ORAL at 05:34

## 2023-11-26 RX ADMIN — Medication 400 MILLIGRAM(S): at 23:05

## 2023-11-26 RX ADMIN — SIMVASTATIN 10 MILLIGRAM(S): 20 TABLET, FILM COATED ORAL at 21:43

## 2023-11-26 RX ADMIN — Medication 400 MILLIGRAM(S): at 22:05

## 2023-11-26 RX ADMIN — Medication 325 MILLIGRAM(S): at 11:38

## 2023-11-26 NOTE — PATIENT PROFILE ADULT - FUNCTIONAL ASSESSMENT - BASIC MOBILITY 6.
2-calculated by average/Not able to assess (calculate score using [facility] averaging method)

## 2023-11-26 NOTE — PATIENT PROFILE ADULT - FALL HARM RISK - HARM RISK INTERVENTIONS

## 2023-11-27 ENCOUNTER — TRANSCRIPTION ENCOUNTER (OUTPATIENT)
Age: 88
End: 2023-11-27

## 2023-11-27 LAB
ANION GAP SERPL CALC-SCNC: 5 MMOL/L — SIGNIFICANT CHANGE UP (ref 5–17)
ANION GAP SERPL CALC-SCNC: 5 MMOL/L — SIGNIFICANT CHANGE UP (ref 5–17)
BUN SERPL-MCNC: 21 MG/DL — SIGNIFICANT CHANGE UP (ref 7–23)
BUN SERPL-MCNC: 21 MG/DL — SIGNIFICANT CHANGE UP (ref 7–23)
CALCIUM SERPL-MCNC: 8.7 MG/DL — SIGNIFICANT CHANGE UP (ref 8.5–10.1)
CALCIUM SERPL-MCNC: 8.7 MG/DL — SIGNIFICANT CHANGE UP (ref 8.5–10.1)
CHLORIDE SERPL-SCNC: 107 MMOL/L — SIGNIFICANT CHANGE UP (ref 96–108)
CHLORIDE SERPL-SCNC: 107 MMOL/L — SIGNIFICANT CHANGE UP (ref 96–108)
CO2 SERPL-SCNC: 28 MMOL/L — SIGNIFICANT CHANGE UP (ref 22–31)
CO2 SERPL-SCNC: 28 MMOL/L — SIGNIFICANT CHANGE UP (ref 22–31)
CREAT SERPL-MCNC: 0.63 MG/DL — SIGNIFICANT CHANGE UP (ref 0.5–1.3)
CREAT SERPL-MCNC: 0.63 MG/DL — SIGNIFICANT CHANGE UP (ref 0.5–1.3)
EGFR: 86 ML/MIN/1.73M2 — SIGNIFICANT CHANGE UP
EGFR: 86 ML/MIN/1.73M2 — SIGNIFICANT CHANGE UP
GLUCOSE SERPL-MCNC: 118 MG/DL — HIGH (ref 70–99)
GLUCOSE SERPL-MCNC: 118 MG/DL — HIGH (ref 70–99)
HCT VFR BLD CALC: 36.9 % — SIGNIFICANT CHANGE UP (ref 34.5–45)
HCT VFR BLD CALC: 36.9 % — SIGNIFICANT CHANGE UP (ref 34.5–45)
HGB BLD-MCNC: 11.9 G/DL — SIGNIFICANT CHANGE UP (ref 11.5–15.5)
HGB BLD-MCNC: 11.9 G/DL — SIGNIFICANT CHANGE UP (ref 11.5–15.5)
MCHC RBC-ENTMCNC: 30.8 PG — SIGNIFICANT CHANGE UP (ref 27–34)
MCHC RBC-ENTMCNC: 30.8 PG — SIGNIFICANT CHANGE UP (ref 27–34)
MCHC RBC-ENTMCNC: 32.2 G/DL — SIGNIFICANT CHANGE UP (ref 32–36)
MCHC RBC-ENTMCNC: 32.2 G/DL — SIGNIFICANT CHANGE UP (ref 32–36)
MCV RBC AUTO: 95.6 FL — SIGNIFICANT CHANGE UP (ref 80–100)
MCV RBC AUTO: 95.6 FL — SIGNIFICANT CHANGE UP (ref 80–100)
NRBC # BLD: 0 /100 WBCS — SIGNIFICANT CHANGE UP (ref 0–0)
NRBC # BLD: 0 /100 WBCS — SIGNIFICANT CHANGE UP (ref 0–0)
PLATELET # BLD AUTO: 168 K/UL — SIGNIFICANT CHANGE UP (ref 150–400)
PLATELET # BLD AUTO: 168 K/UL — SIGNIFICANT CHANGE UP (ref 150–400)
POTASSIUM SERPL-MCNC: 3.4 MMOL/L — LOW (ref 3.5–5.3)
POTASSIUM SERPL-MCNC: 3.4 MMOL/L — LOW (ref 3.5–5.3)
POTASSIUM SERPL-SCNC: 3.4 MMOL/L — LOW (ref 3.5–5.3)
POTASSIUM SERPL-SCNC: 3.4 MMOL/L — LOW (ref 3.5–5.3)
RBC # BLD: 3.86 M/UL — SIGNIFICANT CHANGE UP (ref 3.8–5.2)
RBC # BLD: 3.86 M/UL — SIGNIFICANT CHANGE UP (ref 3.8–5.2)
RBC # FLD: 13.3 % — SIGNIFICANT CHANGE UP (ref 10.3–14.5)
RBC # FLD: 13.3 % — SIGNIFICANT CHANGE UP (ref 10.3–14.5)
SODIUM SERPL-SCNC: 140 MMOL/L — SIGNIFICANT CHANGE UP (ref 135–145)
SODIUM SERPL-SCNC: 140 MMOL/L — SIGNIFICANT CHANGE UP (ref 135–145)
WBC # BLD: 8.03 K/UL — SIGNIFICANT CHANGE UP (ref 3.8–10.5)
WBC # BLD: 8.03 K/UL — SIGNIFICANT CHANGE UP (ref 3.8–10.5)
WBC # FLD AUTO: 8.03 K/UL — SIGNIFICANT CHANGE UP (ref 3.8–10.5)
WBC # FLD AUTO: 8.03 K/UL — SIGNIFICANT CHANGE UP (ref 3.8–10.5)

## 2023-11-27 PROCEDURE — 99232 SBSQ HOSP IP/OBS MODERATE 35: CPT

## 2023-11-27 RX ORDER — ASPIRIN/CALCIUM CARB/MAGNESIUM 324 MG
1 TABLET ORAL
Qty: 0 | Refills: 0 | DISCHARGE
Start: 2023-11-27

## 2023-11-27 RX ORDER — SENNA PLUS 8.6 MG/1
2 TABLET ORAL AT BEDTIME
Refills: 0 | Status: DISCONTINUED | OUTPATIENT
Start: 2023-11-27 | End: 2023-12-04

## 2023-11-27 RX ORDER — POTASSIUM CHLORIDE 20 MEQ
40 PACKET (EA) ORAL ONCE
Refills: 0 | Status: COMPLETED | OUTPATIENT
Start: 2023-11-27 | End: 2023-11-27

## 2023-11-27 RX ORDER — POLYETHYLENE GLYCOL 3350 17 G/17G
17 POWDER, FOR SOLUTION ORAL DAILY
Refills: 0 | Status: COMPLETED | OUTPATIENT
Start: 2023-11-27 | End: 2023-11-30

## 2023-11-27 RX ADMIN — Medication 30 MILLIGRAM(S): at 06:09

## 2023-11-27 RX ADMIN — Medication 1 TABLET(S): at 11:40

## 2023-11-27 RX ADMIN — Medication 325 MILLIGRAM(S): at 11:41

## 2023-11-27 RX ADMIN — POLYETHYLENE GLYCOL 3350 17 GRAM(S): 17 POWDER, FOR SOLUTION ORAL at 17:29

## 2023-11-27 RX ADMIN — SIMVASTATIN 10 MILLIGRAM(S): 20 TABLET, FILM COATED ORAL at 21:52

## 2023-11-27 RX ADMIN — Medication 40 MILLIEQUIVALENT(S): at 09:41

## 2023-11-27 RX ADMIN — Medication 650 MILLIGRAM(S): at 21:50

## 2023-11-27 RX ADMIN — LOSARTAN POTASSIUM 100 MILLIGRAM(S): 100 TABLET, FILM COATED ORAL at 06:09

## 2023-11-27 RX ADMIN — Medication 650 MILLIGRAM(S): at 15:07

## 2023-11-27 RX ADMIN — Medication 650 MILLIGRAM(S): at 14:07

## 2023-11-27 RX ADMIN — Medication 1 TABLET(S): at 11:41

## 2023-11-27 RX ADMIN — Medication 1000 UNIT(S): at 11:40

## 2023-11-27 RX ADMIN — SENNA PLUS 2 TABLET(S): 8.6 TABLET ORAL at 21:52

## 2023-11-27 RX ADMIN — Medication 650 MILLIGRAM(S): at 20:54

## 2023-11-27 RX ADMIN — PANTOPRAZOLE SODIUM 40 MILLIGRAM(S): 20 TABLET, DELAYED RELEASE ORAL at 06:09

## 2023-11-27 RX ADMIN — CHLORHEXIDINE GLUCONATE 1 APPLICATION(S): 213 SOLUTION TOPICAL at 11:41

## 2023-11-27 NOTE — DISCHARGE NOTE PROVIDER - NSDCMRMEDTOKEN_GEN_ALL_CORE_FT
ascorbic acid 500 mg oral tablet: 1 tab(s) orally once a day  aspirin 325 mg oral delayed release tablet: 1 tab(s) orally once a day  Calcium 600+D oral tablet: 1 tab(s) orally 2 times a day  cholecalciferol 50 mcg (2000 intl units) oral tablet: 1 tab(s) orally once a day  Fish Oil:   Left [place] Knee Brace - 0-90 Degrees: Please leave in 0-90 degrees. Use at all times. MDD: 1  losartan 100 mg oral tablet: 1 tab(s) orally once a day  Multiple Vitamins oral tablet: 1 tab(s) orally once a day  NIFEdipine 30 mg oral tablet, extended release: 1 tab(s) orally once a day  PriLOSEC 20 mg oral delayed release capsule: 1 cap(s) orally once a day  simvastatin 10 mg oral tablet: 1 tab(s) orally once a day (at bedtime)   ascorbic acid 500 mg oral tablet: 1 tab(s) orally once a day  aspirin 325 mg oral delayed release tablet: 1 tab(s) orally once a day  bisacodyl 5 mg oral delayed release tablet: 1 tab(s) orally every 12 hours As needed Constipation  Calcium 600+D oral tablet: 1 tab(s) orally 2 times a day  cholecalciferol 50 mcg (2000 intl units) oral tablet: 1 tab(s) orally once a day  Fish Oil:   Left [place] Knee Brace - 0-90 Degrees: Please leave in 0-90 degrees. Use at all times. MDD: 1  losartan 100 mg oral tablet: 1 tab(s) orally once a day  Multiple Vitamins oral tablet: 1 tab(s) orally once a day  NIFEdipine 30 mg oral tablet, extended release: 1 tab(s) orally once a day  PriLOSEC 20 mg oral delayed release capsule: 1 cap(s) orally once a day  senna leaf extract oral tablet: 2 tab(s) orally once a day (at bedtime)  simvastatin 10 mg oral tablet: 1 tab(s) orally once a day (at bedtime)   ascorbic acid 500 mg oral tablet: 1 tab(s) orally once a day  aspirin 325 mg oral delayed release tablet: 1 tab(s) orally once a day  bisacodyl 5 mg oral delayed release tablet: 1 tab(s) orally every 12 hours As needed Constipation  Calcium 600+D oral tablet: 1 tab(s) orally 2 times a day  cholecalciferol 50 mcg (2000 intl units) oral tablet: 1 tab(s) orally once a day  Fish Oil:   Left [place] Knee Brace - 0-90 Degrees: Please leave in 0-90 degrees. Use at all times. MDD: 1  losartan 100 mg oral tablet: 1 tab(s) orally once a day  Multiple Vitamins oral tablet: 1 tab(s) orally once a day  NIFEdipine 30 mg oral tablet, extended release: 1 tab(s) orally once a day  PriLOSEC 20 mg oral delayed release capsule: 1 cap(s) orally once a day  senna leaf extract oral tablet: 2 tab(s) orally once a day (at bedtime)  simvastatin 10 mg oral tablet: 1 tab(s) orally once a day (at bedtime)   ascorbic acid 500 mg oral tablet: 1 tab(s) orally once a day  aspirin 325 mg oral delayed release tablet: 1 tab(s) orally once a day  bisacodyl 5 mg oral delayed release tablet: 1 tab(s) orally every 12 hours As needed Constipation  Calcium 600+D oral tablet: 1 tab(s) orally 2 times a day  cholecalciferol 50 mcg (2000 intl units) oral tablet: 1 tab(s) orally once a day  Fish Oil:   Left [place] Knee Brace - 0-90 Degrees: Please leave in 0-90 degrees. Use at all times. MDD: 1  losartan 100 mg oral tablet: 1 tab(s) orally once a day  Multiple Vitamins oral tablet: 1 tab(s) orally once a day  NIFEdipine 30 mg oral tablet, extended release: 1 tab(s) orally once a day  PriLOSEC 20 mg oral delayed release capsule: 1 cap(s) orally once a day  senna leaf extract oral tablet: 2 tab(s) orally once a day (at bedtime)  simvastatin 10 mg oral tablet: 1 tab(s) orally once a day (at bedtime)

## 2023-11-27 NOTE — DISCHARGE NOTE PROVIDER - NSDCFUADDAPPT_GEN_ALL_CORE_FT
Patient will need repeat xray in 1 week (12/01).     please continue aspirin for 30 days for dvt proprophylaxis

## 2023-11-27 NOTE — DISCHARGE NOTE PROVIDER - HOSPITAL COURSE
Patient is 88 F pmh htn, hld, endometrial cancer comes in for a fall. Was noted on xray to have tibial plateau fracture. orthopedic was consulted. recommend no surgical intervention needed at this time. pain management and aspirin 325 m for 30 days for dvt prophylaxis. patient was admitted for intractable pain secondary to tibial plateau fracture. pt was ordered and recommend subacute rehab. Patient had no acute event overnight. labs unremarkable. patient labs were wnl. no drop in hemoglobin. patient vital signs were unremarkable. stable to be discharged to rehab. patient is allowed to will allow FFWB to enable mobilization with repeat xr in 1 week. Follow up with pcp in 1 week. Patient is 88 F pmh htn, hld, endometrial cancer comes in for a fall. Was noted on xray to have tibial plateau fracture. orthopedic was consulted. recommend no surgical intervention needed at this time. pain management and aspirin 325 m for 30 days for dvt prophylaxis. patient was admitted for intractable pain secondary to tibial plateau fracture. pt was ordered and recommend subacute rehab. Patient had no acute event overnight. labs unremarkable. patient labs were wnl. no drop in hemoglobin. patient vital signs were unremarkable. stable to be discharged to rehab. patient is allowed to will allow FFWB to enable mobilization with repeat xr in 1 week. Follow up with pcp in 1 week.     intractable pain secondary to tibial plateau fracture  hypokalemia  Hypertension  Hyperlipidemia  endometrial cancer Patient is 88 F pmh htn, hld, endometrial cancer comes in for a fall. Was noted on xray to have tibial plateau fracture. orthopedic was consulted. recommend no surgical intervention needed at this time. pain management and aspirin 325 m for 30 days for dvt prophylaxis. patient was admitted for intractable pain secondary to tibial plateau fracture. pt was ordered and recommend subacute rehab. Patient had no acute event overnight. labs unremarkable. patient labs were wnl. no drop in hemoglobin. patient vital signs were unremarkable. stable to be discharged to rehab. patient is allowed to will allow FFWB to enable mobilization with repeat xr in 1 week. Follow up with pcp in 1 week.     intractable pain secondary to tibial plateau fracture  hypokalemia  Hypertension  Hyperlipidemia  endometrial cancer

## 2023-11-27 NOTE — DISCHARGE NOTE PROVIDER - NSDCCPCAREPLAN_GEN_ALL_CORE_FT
PRINCIPAL DISCHARGE DIAGNOSIS  Diagnosis: Tibial plateau fracture, left  Assessment and Plan of Treatment: Seen by ortho - no surgical intervention   Discharge to subacute rehab

## 2023-11-27 NOTE — DISCHARGE NOTE PROVIDER - NSDCACTIVITY_GEN_ALL_CORE
No restrictions/Follow Instructions Provided by your Surgical Team Follow Instructions Provided by your Surgical Team

## 2023-11-28 PROCEDURE — 99232 SBSQ HOSP IP/OBS MODERATE 35: CPT

## 2023-11-28 RX ADMIN — SIMVASTATIN 10 MILLIGRAM(S): 20 TABLET, FILM COATED ORAL at 21:49

## 2023-11-28 RX ADMIN — Medication 325 MILLIGRAM(S): at 11:59

## 2023-11-28 RX ADMIN — Medication 1000 UNIT(S): at 12:00

## 2023-11-28 RX ADMIN — CHLORHEXIDINE GLUCONATE 1 APPLICATION(S): 213 SOLUTION TOPICAL at 05:40

## 2023-11-28 RX ADMIN — Medication 1 TABLET(S): at 11:59

## 2023-11-28 RX ADMIN — Medication 1 TABLET(S): at 12:00

## 2023-11-28 RX ADMIN — LOSARTAN POTASSIUM 100 MILLIGRAM(S): 100 TABLET, FILM COATED ORAL at 05:40

## 2023-11-28 RX ADMIN — Medication 30 MILLIGRAM(S): at 05:40

## 2023-11-28 RX ADMIN — PANTOPRAZOLE SODIUM 40 MILLIGRAM(S): 20 TABLET, DELAYED RELEASE ORAL at 05:40

## 2023-11-29 PROCEDURE — 99232 SBSQ HOSP IP/OBS MODERATE 35: CPT

## 2023-11-29 RX ADMIN — PANTOPRAZOLE SODIUM 40 MILLIGRAM(S): 20 TABLET, DELAYED RELEASE ORAL at 06:05

## 2023-11-29 RX ADMIN — LOSARTAN POTASSIUM 100 MILLIGRAM(S): 100 TABLET, FILM COATED ORAL at 06:05

## 2023-11-29 RX ADMIN — Medication 650 MILLIGRAM(S): at 06:07

## 2023-11-29 RX ADMIN — Medication 1 TABLET(S): at 11:17

## 2023-11-29 RX ADMIN — Medication 650 MILLIGRAM(S): at 07:07

## 2023-11-29 RX ADMIN — POLYETHYLENE GLYCOL 3350 17 GRAM(S): 17 POWDER, FOR SOLUTION ORAL at 11:17

## 2023-11-29 RX ADMIN — Medication 325 MILLIGRAM(S): at 11:17

## 2023-11-29 RX ADMIN — SENNA PLUS 2 TABLET(S): 8.6 TABLET ORAL at 21:42

## 2023-11-29 RX ADMIN — Medication 30 MILLIGRAM(S): at 06:05

## 2023-11-29 RX ADMIN — Medication 650 MILLIGRAM(S): at 22:47

## 2023-11-29 RX ADMIN — CHLORHEXIDINE GLUCONATE 1 APPLICATION(S): 213 SOLUTION TOPICAL at 06:04

## 2023-11-29 RX ADMIN — SIMVASTATIN 10 MILLIGRAM(S): 20 TABLET, FILM COATED ORAL at 21:42

## 2023-11-29 RX ADMIN — Medication 1000 UNIT(S): at 11:17

## 2023-11-29 RX ADMIN — Medication 650 MILLIGRAM(S): at 23:48

## 2023-11-30 LAB
ANION GAP SERPL CALC-SCNC: 5 MMOL/L — SIGNIFICANT CHANGE UP (ref 5–17)
ANION GAP SERPL CALC-SCNC: 5 MMOL/L — SIGNIFICANT CHANGE UP (ref 5–17)
BUN SERPL-MCNC: 17 MG/DL — SIGNIFICANT CHANGE UP (ref 7–23)
BUN SERPL-MCNC: 17 MG/DL — SIGNIFICANT CHANGE UP (ref 7–23)
CALCIUM SERPL-MCNC: 9.1 MG/DL — SIGNIFICANT CHANGE UP (ref 8.5–10.1)
CALCIUM SERPL-MCNC: 9.1 MG/DL — SIGNIFICANT CHANGE UP (ref 8.5–10.1)
CHLORIDE SERPL-SCNC: 108 MMOL/L — SIGNIFICANT CHANGE UP (ref 96–108)
CHLORIDE SERPL-SCNC: 108 MMOL/L — SIGNIFICANT CHANGE UP (ref 96–108)
CO2 SERPL-SCNC: 30 MMOL/L — SIGNIFICANT CHANGE UP (ref 22–31)
CO2 SERPL-SCNC: 30 MMOL/L — SIGNIFICANT CHANGE UP (ref 22–31)
CREAT SERPL-MCNC: 0.61 MG/DL — SIGNIFICANT CHANGE UP (ref 0.5–1.3)
CREAT SERPL-MCNC: 0.61 MG/DL — SIGNIFICANT CHANGE UP (ref 0.5–1.3)
EGFR: 86 ML/MIN/1.73M2 — SIGNIFICANT CHANGE UP
EGFR: 86 ML/MIN/1.73M2 — SIGNIFICANT CHANGE UP
GLUCOSE SERPL-MCNC: 109 MG/DL — HIGH (ref 70–99)
GLUCOSE SERPL-MCNC: 109 MG/DL — HIGH (ref 70–99)
HCT VFR BLD CALC: 38.8 % — SIGNIFICANT CHANGE UP (ref 34.5–45)
HCT VFR BLD CALC: 38.8 % — SIGNIFICANT CHANGE UP (ref 34.5–45)
HGB BLD-MCNC: 12.8 G/DL — SIGNIFICANT CHANGE UP (ref 11.5–15.5)
HGB BLD-MCNC: 12.8 G/DL — SIGNIFICANT CHANGE UP (ref 11.5–15.5)
MCHC RBC-ENTMCNC: 31.4 PG — SIGNIFICANT CHANGE UP (ref 27–34)
MCHC RBC-ENTMCNC: 31.4 PG — SIGNIFICANT CHANGE UP (ref 27–34)
MCHC RBC-ENTMCNC: 33 G/DL — SIGNIFICANT CHANGE UP (ref 32–36)
MCHC RBC-ENTMCNC: 33 G/DL — SIGNIFICANT CHANGE UP (ref 32–36)
MCV RBC AUTO: 95.3 FL — SIGNIFICANT CHANGE UP (ref 80–100)
MCV RBC AUTO: 95.3 FL — SIGNIFICANT CHANGE UP (ref 80–100)
NRBC # BLD: 0 /100 WBCS — SIGNIFICANT CHANGE UP (ref 0–0)
NRBC # BLD: 0 /100 WBCS — SIGNIFICANT CHANGE UP (ref 0–0)
PLATELET # BLD AUTO: 218 K/UL — SIGNIFICANT CHANGE UP (ref 150–400)
PLATELET # BLD AUTO: 218 K/UL — SIGNIFICANT CHANGE UP (ref 150–400)
POTASSIUM SERPL-MCNC: 3.6 MMOL/L — SIGNIFICANT CHANGE UP (ref 3.5–5.3)
POTASSIUM SERPL-MCNC: 3.6 MMOL/L — SIGNIFICANT CHANGE UP (ref 3.5–5.3)
POTASSIUM SERPL-SCNC: 3.6 MMOL/L — SIGNIFICANT CHANGE UP (ref 3.5–5.3)
POTASSIUM SERPL-SCNC: 3.6 MMOL/L — SIGNIFICANT CHANGE UP (ref 3.5–5.3)
RBC # BLD: 4.07 M/UL — SIGNIFICANT CHANGE UP (ref 3.8–5.2)
RBC # BLD: 4.07 M/UL — SIGNIFICANT CHANGE UP (ref 3.8–5.2)
RBC # FLD: 13.3 % — SIGNIFICANT CHANGE UP (ref 10.3–14.5)
RBC # FLD: 13.3 % — SIGNIFICANT CHANGE UP (ref 10.3–14.5)
SODIUM SERPL-SCNC: 143 MMOL/L — SIGNIFICANT CHANGE UP (ref 135–145)
SODIUM SERPL-SCNC: 143 MMOL/L — SIGNIFICANT CHANGE UP (ref 135–145)
WBC # BLD: 7.93 K/UL — SIGNIFICANT CHANGE UP (ref 3.8–10.5)
WBC # BLD: 7.93 K/UL — SIGNIFICANT CHANGE UP (ref 3.8–10.5)
WBC # FLD AUTO: 7.93 K/UL — SIGNIFICANT CHANGE UP (ref 3.8–10.5)
WBC # FLD AUTO: 7.93 K/UL — SIGNIFICANT CHANGE UP (ref 3.8–10.5)

## 2023-11-30 PROCEDURE — 99231 SBSQ HOSP IP/OBS SF/LOW 25: CPT

## 2023-11-30 RX ADMIN — Medication 30 MILLIGRAM(S): at 05:38

## 2023-11-30 RX ADMIN — CHLORHEXIDINE GLUCONATE 1 APPLICATION(S): 213 SOLUTION TOPICAL at 05:38

## 2023-11-30 RX ADMIN — Medication 325 MILLIGRAM(S): at 11:44

## 2023-11-30 RX ADMIN — Medication 650 MILLIGRAM(S): at 06:41

## 2023-11-30 RX ADMIN — PANTOPRAZOLE SODIUM 40 MILLIGRAM(S): 20 TABLET, DELAYED RELEASE ORAL at 06:34

## 2023-11-30 RX ADMIN — Medication 650 MILLIGRAM(S): at 07:40

## 2023-11-30 RX ADMIN — LOSARTAN POTASSIUM 100 MILLIGRAM(S): 100 TABLET, FILM COATED ORAL at 05:39

## 2023-11-30 RX ADMIN — SENNA PLUS 2 TABLET(S): 8.6 TABLET ORAL at 21:43

## 2023-11-30 RX ADMIN — SIMVASTATIN 10 MILLIGRAM(S): 20 TABLET, FILM COATED ORAL at 21:44

## 2023-11-30 RX ADMIN — Medication 1 TABLET(S): at 11:43

## 2023-11-30 RX ADMIN — Medication 1000 UNIT(S): at 11:43

## 2023-12-01 PROCEDURE — 99231 SBSQ HOSP IP/OBS SF/LOW 25: CPT

## 2023-12-01 RX ADMIN — SENNA PLUS 2 TABLET(S): 8.6 TABLET ORAL at 22:55

## 2023-12-01 RX ADMIN — LOSARTAN POTASSIUM 100 MILLIGRAM(S): 100 TABLET, FILM COATED ORAL at 05:48

## 2023-12-01 RX ADMIN — Medication 1000 UNIT(S): at 11:25

## 2023-12-01 RX ADMIN — CHLORHEXIDINE GLUCONATE 1 APPLICATION(S): 213 SOLUTION TOPICAL at 05:52

## 2023-12-01 RX ADMIN — Medication 1 TABLET(S): at 11:26

## 2023-12-01 RX ADMIN — Medication 30 MILLIGRAM(S): at 05:48

## 2023-12-01 RX ADMIN — Medication 325 MILLIGRAM(S): at 11:26

## 2023-12-01 RX ADMIN — SIMVASTATIN 10 MILLIGRAM(S): 20 TABLET, FILM COATED ORAL at 22:55

## 2023-12-01 RX ADMIN — PANTOPRAZOLE SODIUM 40 MILLIGRAM(S): 20 TABLET, DELAYED RELEASE ORAL at 06:31

## 2023-12-02 PROCEDURE — 99231 SBSQ HOSP IP/OBS SF/LOW 25: CPT

## 2023-12-02 RX ADMIN — Medication 1000 UNIT(S): at 11:03

## 2023-12-02 RX ADMIN — SENNA PLUS 2 TABLET(S): 8.6 TABLET ORAL at 21:42

## 2023-12-02 RX ADMIN — PANTOPRAZOLE SODIUM 40 MILLIGRAM(S): 20 TABLET, DELAYED RELEASE ORAL at 06:43

## 2023-12-02 RX ADMIN — Medication 325 MILLIGRAM(S): at 11:02

## 2023-12-02 RX ADMIN — Medication 1 TABLET(S): at 11:03

## 2023-12-02 RX ADMIN — CHLORHEXIDINE GLUCONATE 1 APPLICATION(S): 213 SOLUTION TOPICAL at 05:25

## 2023-12-02 RX ADMIN — LOSARTAN POTASSIUM 100 MILLIGRAM(S): 100 TABLET, FILM COATED ORAL at 05:22

## 2023-12-02 RX ADMIN — Medication 30 MILLIGRAM(S): at 05:22

## 2023-12-02 RX ADMIN — SIMVASTATIN 10 MILLIGRAM(S): 20 TABLET, FILM COATED ORAL at 21:36

## 2023-12-02 NOTE — CHART NOTE - NSCHARTNOTEFT_GEN_A_CORE
Patient seen for length of stay nutrition risk rescreening. Patient has been screened for and presents negative for    -Pressure ulcers stage 2 or greater  -Enteral or Parenteral Nutrition  -BMI <18  -KupstefceuP9P >8  -Chewing/Swallowing Difficulty  -Decreased appetite  -Unintentional Weight Loss  -Inadequate diet (i.e. NPO/Clear Liquids)    No intervention required at this time. RD remains available. Pt seen on medical floor, adm w/ L knee pain, found to have a tibial plateau fracture, stable for MAYA placement - Awaiting bed. Pt Denies N/V/D/C/Chewing/Swallowing issues, No food allergies/ weight loss. Consuming 75% of meals. Full upper dentures w/ pt. No c/o at this time. Pt appears WDWN without visual signs of malnutrition.

## 2023-12-03 PROCEDURE — 99231 SBSQ HOSP IP/OBS SF/LOW 25: CPT

## 2023-12-03 RX ADMIN — LOSARTAN POTASSIUM 100 MILLIGRAM(S): 100 TABLET, FILM COATED ORAL at 06:12

## 2023-12-03 RX ADMIN — Medication 1 TABLET(S): at 11:08

## 2023-12-03 RX ADMIN — Medication 1000 UNIT(S): at 11:10

## 2023-12-03 RX ADMIN — PANTOPRAZOLE SODIUM 40 MILLIGRAM(S): 20 TABLET, DELAYED RELEASE ORAL at 06:15

## 2023-12-03 RX ADMIN — Medication 650 MILLIGRAM(S): at 16:09

## 2023-12-03 RX ADMIN — SIMVASTATIN 10 MILLIGRAM(S): 20 TABLET, FILM COATED ORAL at 21:12

## 2023-12-03 RX ADMIN — Medication 325 MILLIGRAM(S): at 11:08

## 2023-12-03 RX ADMIN — Medication 650 MILLIGRAM(S): at 17:00

## 2023-12-03 RX ADMIN — SENNA PLUS 2 TABLET(S): 8.6 TABLET ORAL at 21:13

## 2023-12-03 RX ADMIN — CHLORHEXIDINE GLUCONATE 1 APPLICATION(S): 213 SOLUTION TOPICAL at 06:12

## 2023-12-03 RX ADMIN — Medication 30 MILLIGRAM(S): at 06:12

## 2023-12-03 RX ADMIN — Medication 1 TABLET(S): at 11:09

## 2023-12-03 NOTE — PROGRESS NOTE ADULT - ASSESSMENT
88y Female with a past medical history as described above who presented for evaluation of L knee pain. Found to have a tibial plateau fracture.     # Fracture of tibial plateau, left - Resulted from mechanical fall at restaurant. No head trauma or LOC. No lightheadedness preceding. - Evaluated by ortho, appreciate recs. Non op management for now.   -Per ortho recs, Will go on ASA 325mg PO daily for DVT ppx x30d.     -Plan for outpt management with ortho  - PT eval - recommending [facility]  - LLE NWB in [facility] with assist devices as needed    # HTN -, c/w losartan  100mg PO daily, and nifedipien 30mg PO daily  # HLD  - c/w statin - DASH diet  # H/O endometrial cancer - Outpt follow-up as indicated     Stable for [facility] placement - has insurance auth, awaiting bed (no bed until Monday)  
88y Female with a past medical history as described above who presented for evaluation of L knee pain. Found to have a tibial plateau fracture.     # Fracture of tibial plateau, left - Resulted from mechanical fall at restaurant. No head trauma or LOC. No lightheadedness preceding. - Evaluated by ortho, appreciate recs. Non op management for now.   -Per ortho recs, Will go on ASA 325mg PO daily for DVT ppx x30d.     -Plan for outpt management with ortho  - PT eval - recommending [facility]  - LLE NWB in [facility] with assist devices as needed    # HTN -, c/w losartan  100mg PO daily, and nifedipien 30mg PO daily  # HLD  - c/w statin - DASH diet  # H/O endometrial cancer - Outpt follow-up as indicated     Stable for [facility] placement - has insurance auth, awaiting bed (no bed until Monday)  
88y Female with a past medical history as described above who presented for evaluation of L knee pain. Found to have a tibial plateau fracture.     # Fracture of tibial plateau, left - Resulted from mechanical fall at restaurant. No head trauma or LOC. No lightheadedness preceding. - Evaluated by ortho, appreciate recs. Non op management for now. Will go on ASA 325mg PO daily for DVT ppx x30d.   - Add PPI given aspirin therapy  - Plan for outpt management with ortho  - PT eval - recommending [facility]  - LLE NWB in [facility] with assist devices as needed    # HTN - Home meds  # HLD  - home meds - DASH diet  # H/O endometrial cancer - Outpt follow-up as indicated     Stable for [facility] placement.    
88y Female with a past medical history as described above who presented for evaluation of L knee pain. Found to have a tibial plateau fracture.     # Fracture of tibial plateau, left - Resulted from mechanical fall at restaurant. No head trauma or LOC. No lightheadedness preceding. - Evaluated by ortho, appreciate recs. Non op management for now. Will go on ASA 325mg PO daily for DVT ppx x30d.   Plan for outpt management with ortho  - PT eval - recommending [facility]  - LLE NWB in [facility] with assist devices as needed    # HTN - Home meds  # HLD  - home meds - DASH diet  # H/O endometrial cancer - Outpt follow-up as indicated     Stable for [facility] placement - has insurance auth, awaiting bed.  
88y Female with a past medical history as described above who presented for evaluation of L knee pain. Found to have a tibial plateau fracture.     # Fracture of tibial plateau, left - Resulted from mechanical fall at restaurant. No head trauma or LOC. No lightheadedness preceding. - Evaluated by ortho, appreciate recs. Non op management for now. Will go on ASA 325mg PO daily for DVT ppx x30d.   - Add PPI given aspirin therapy  - Plan for outpt management with ortho  - PT eval  - Pain control  - LLE NWB in [facility] with assist devices as needed    # HTN - Home meds  # HLD  - home meds - DASH diet  # H/O endometrial cancer - Outpt follow-up as indicated     Anticipate [facility] placement.    
88y Female with a past medical history as described above who presented for evaluation of L knee pain. Found to have a tibial plateau fracture.     # Fracture of tibial plateau, left - Resulted from mechanical fall at restaurant. No head trauma or LOC. No lightheadedness preceding. - Evaluated by ortho, appreciate recs. Non op management for now. Will go on ASA 325mg PO daily for DVT ppx x30d.   Plan for outpt management with ortho  - PT eval - recommending [facility]  - LLE NWB in [facility] with assist devices as needed    # HTN - Home meds  # HLD  - home meds - DASH diet  # H/O endometrial cancer - Outpt follow-up as indicated     Stable for [facility] placement.    Awaiting insurance auth.   
88y Female with a past medical history as described above who presented for evaluation of L knee pain. Found to have a tibial plateau fracture.     # Fracture of tibial plateau, left - Resulted from mechanical fall at restaurant. No head trauma or LOC. No lightheadedness preceding. - Evaluated by ortho, appreciate recs. Non op management for now. Will go on ASA 325mg PO daily for DVT ppx x30d.   Plan for outpt management with ortho  - PT eval - recommending [facility]  - LLE NWB in [place] with assist devices as needed    # HTN - Home meds  # HLD  - home meds - DASH diet  # H/O endometrial cancer - Outpt follow-up as indicated     Stable for [facility] placement.    Awaiting insurance auth.

## 2023-12-03 NOTE — PROGRESS NOTE ADULT - SUBJECTIVE AND OBJECTIVE BOX
Patient: EMILY PIMENTEL [id number] 88y Female                            Hospitalist Attending Note    No complaints.  No pain / weakness / numbness.     ____________________PHYSICAL EXAM:  GENERAL:  NAD Alert and Oriented x 3   HEENT: NCAT  CARDIOVASCULAR:  S1, S2  LUNGS: CTAB  ABDOMEN:  soft, (-) tenderness, (-) distension, (+) bowel sounds, (-) guarding, (-) rebound (-) rigidity  EXTREMITIES:  no cyanosis / clubbing / edema.  L knee immobilizer in place.   ____________________    VITALS:  Vital Signs Last 24 Hrs  T(C): 36.8 (2023 10:26), Max: 37.1 (2023 16:58)  T(F): 98.2 (2023 10:26), Max: 98.7 (2023 16:58)  HR: 93 (2023 10:26) (60 - 93)  BP: 130/77 (2023 10:26) (130/77 - 149/68)  BP(mean): 100 (2023 16:58) (100 - 100)  RR: 18 (2023 10:26) (17 - 18)  SpO2: 94% (2023 10:26) (94% - 97%)    Parameters below as of 2023 10:26  Patient On (Oxygen Delivery Method): room air     Daily     Daily Weight in k.2 (2023 04:57)  CAPILLARY BLOOD GLUCOSE        I&O's Summary    2023 07:  -  2023 07:00  --------------------------------------------------------  IN: 600 mL / OUT: 1400 mL / NET: -800 mL    2023 07:01  -  2023 16:38  --------------------------------------------------------  IN: 480 mL / OUT: 500 mL / NET: -20 mL        LABS:                        12.5   8.24  )-----------( 176      ( 2023 06:25 )             38.9         142  |  108  |  16  ----------------------------<  122<H>  3.5   |  29  |  0.67    Ca    8.5      2023 06:25  Mg     2.2         TPro  6.3  /  Alb  2.9<L>  /  TBili  0.6  /  DBili  x   /  AST  13<L>  /  ALT  19  /  AlkPhos  70      PT/INR - ( 2023 22:13 )   PT: 10.8 sec;   INR: 0.90 ratio         PTT - ( 2023 22:13 )  PTT:28.1 sec  LIVER FUNCTIONS - ( 2023 06:25 )  Alb: 2.9 g/dL / Pro: 6.3 gm/dL / ALK PHOS: 70 U/L / ALT: 19 U/L / AST: 13 U/L / GGT: x           Urinalysis Basic - ( 2023 06:25 )    Color: x / Appearance: x / SG: x / pH: x  Gluc: 122 mg/dL / Ketone: x  / Bili: x / Urobili: x   Blood: x / Protein: x / Nitrite: x   Leuk Esterase: x / RBC: x / WBC x   Sq Epi: x / Non Sq Epi: x / Bacteria: x              MEDICATIONS:  acetaminophen     Tablet .. 650 milliGRAM(s) Oral every 6 hours PRN  aspirin enteric coated 325 milliGRAM(s) Oral daily  calcium carbonate 1250 mG  + Vitamin D (OsCal 500 + D) 1 Tablet(s) Oral daily  chlorhexidine 2% Cloths 1 Application(s) Topical <User Schedule>  cholecalciferol 1000 Unit(s) Oral daily  influenza  Vaccine (HIGH DOSE) 0.7 milliLiter(s) IntraMuscular once  losartan 100 milliGRAM(s) Oral daily  multivitamin 1 Tablet(s) Oral daily  NIFEdipine XL 30 milliGRAM(s) Oral daily  pantoprazole    Tablet 40 milliGRAM(s) Oral before breakfast  simvastatin 10 milliGRAM(s) Oral at bedtime    
                          Patient: EMILY PIMENTEL [id number] 88y Female                            Hospitalist Attending Note    No complaints.  No pain / weakness / numbness.     ____________________PHYSICAL EXAM:  GENERAL:  NAD Alert and Oriented x 3   HEENT: NCAT  CARDIOVASCULAR:  S1, S2  LUNGS: CTAB  ABDOMEN:  soft, (-) tenderness, (-) distension, (+) bowel sounds, (-) guarding, (-) rebound (-) rigidity  EXTREMITIES:  no cyanosis / clubbing / edema.  L knee immobilizer in place.   ____________________    VITALS:  Vital Signs Last 24 Hrs  T(C): 36.5 (2023 11:00), Max: 37 (2023 04:21)  T(F): 97.7 (2023 11:00), Max: 98.6 (2023 04:21)  HR: 84 (:43) (75 - 86)  BP: 135/77 (2023 11:43) (114/73 - 137/70)  BP(mean): --  RR: 19 (2023 11:43) (16 - 19)  SpO2: 95% (2023 11:43) (92% - 96%)    Parameters below as of 2023 11:43  Patient On (Oxygen Delivery Method): room air     Daily     Daily Weight in k.4 (2023 04:21)  CAPILLARY BLOOD GLUCOSE        I&O's Summary    2023 07:01  -  2023 07:00  --------------------------------------------------------  IN: 0 mL / OUT: 600 mL / NET: -600 mL        LABS:                        12.8   7.93  )-----------( 218      ( 2023 07:00 )             38.8     11    143  |  108  |  17  ----------------------------<  109<H>  3.6   |  30  |  0.61    Ca    9.1      2023 07:00          Urinalysis Basic - ( 2023 07:00 )    Color: x / Appearance: x / SG: x / pH: x  Gluc: 109 mg/dL / Ketone: x  / Bili: x / Urobili: x   Blood: x / Protein: x / Nitrite: x   Leuk Esterase: x / RBC: x / WBC x   Sq Epi: x / Non Sq Epi: x / Bacteria: x              MEDICATIONS:  acetaminophen     Tablet .. 650 milliGRAM(s) Oral every 6 hours PRN  aspirin enteric coated 325 milliGRAM(s) Oral daily  bisacodyl 5 milliGRAM(s) Oral every 12 hours PRN  calcium carbonate 1250 mG  + Vitamin D (OsCal 500 + D) 1 Tablet(s) Oral daily  chlorhexidine 2% Cloths 1 Application(s) Topical <User Schedule>  cholecalciferol 1000 Unit(s) Oral daily  influenza  Vaccine (HIGH DOSE) 0.7 milliLiter(s) IntraMuscular once  losartan 100 milliGRAM(s) Oral daily  multivitamin 1 Tablet(s) Oral daily  NIFEdipine XL 30 milliGRAM(s) Oral daily  oxycodone    5 mG/acetaminophen 325 mG 2 Tablet(s) Oral every 6 hours PRN  pantoprazole    Tablet 40 milliGRAM(s) Oral before breakfast  senna 2 Tablet(s) Oral at bedtime  simvastatin 10 milliGRAM(s) Oral at bedtime    
                          Patient: EMILY PIMENTEL [id number] 88y Female                            Hospitalist Attending Note    No complaints.  No pain / weakness / numbness.     ____________________PHYSICAL EXAM:  GENERAL:  NAD Alert and Oriented x 3   HEENT: NCAT  CARDIOVASCULAR:  S1, S2  LUNGS: CTAB  ABDOMEN:  soft, (-) tenderness, (-) distension, (+) bowel sounds, (-) guarding, (-) rebound (-) rigidity  EXTREMITIES:  no cyanosis / clubbing / edema.  L knee immobilizer in place.   ____________________    VITALS:  Vital Signs Last 24 Hrs  T(C): 36.3 (2023 16:25), Max: 36.8 (2023 23:58)  T(F): 97.4 (2023 16:25), Max: 98.2 (2023 23:58)  HR: 78 (2023 16:25) (69 - 85)  BP: 136/81 (2023 16:25) (118/69 - 136/81)  BP(mean): --  RR: 18 (2023 16:25) (18 - 18)  SpO2: 95% (2023 16:25) (95% - 96%)    Parameters below as of 2023 16:25  Patient On (Oxygen Delivery Method): room air     Daily     Daily Weight in k (2023 05:10)  CAPILLARY BLOOD GLUCOSE        I&O's Summary    2023 07:01  -  2023 07:00  --------------------------------------------------------  IN: 480 mL / OUT: 900 mL / NET: -420 mL        LABS:                        11.9   8.03  )-----------( 168      ( 2023 07:30 )             36.9     11-27    140  |  107  |  21  ----------------------------<  118<H>  3.4<L>   |  28  |  0.63    Ca    8.7      2023 07:30  Mg     2.2     11-    TPro  6.3  /  Alb  2.9<L>  /  TBili  0.6  /  DBili  x   /  AST  13<L>  /  ALT  19  /  AlkPhos  70  11-      LIVER FUNCTIONS - ( 2023 06:25 )  Alb: 2.9 g/dL / Pro: 6.3 gm/dL / ALK PHOS: 70 U/L / ALT: 19 U/L / AST: 13 U/L / GGT: x           Urinalysis Basic - ( 2023 07:30 )    Color: x / Appearance: x / SG: x / pH: x  Gluc: 118 mg/dL / Ketone: x  / Bili: x / Urobili: x   Blood: x / Protein: x / Nitrite: x   Leuk Esterase: x / RBC: x / WBC x   Sq Epi: x / Non Sq Epi: x / Bacteria: x              MEDICATIONS:  acetaminophen     Tablet .. 650 milliGRAM(s) Oral every 6 hours PRN  aspirin enteric coated 325 milliGRAM(s) Oral daily  calcium carbonate 1250 mG  + Vitamin D (OsCal 500 + D) 1 Tablet(s) Oral daily  chlorhexidine 2% Cloths 1 Application(s) Topical <User Schedule>  cholecalciferol 1000 Unit(s) Oral daily  influenza  Vaccine (HIGH DOSE) 0.7 milliLiter(s) IntraMuscular once  losartan 100 milliGRAM(s) Oral daily  multivitamin 1 Tablet(s) Oral daily  NIFEdipine XL 30 milliGRAM(s) Oral daily  pantoprazole    Tablet 40 milliGRAM(s) Oral before breakfast  polyethylene glycol 3350 17 Gram(s) Oral daily  senna 2 Tablet(s) Oral at bedtime  simvastatin 10 milliGRAM(s) Oral at bedtime    
                          Patient: EMILY PIMENTEL [id number] 88y Female                            Hospitalist Attending Note    No complaints.  No pain / weakness / numbness.     ____________________PHYSICAL EXAM:  GENERAL:  NAD Alert and Oriented x 3   HEENT: NCAT  CARDIOVASCULAR:  S1, S2  LUNGS: CTAB  ABDOMEN:  soft, (-) tenderness, (-) distension, (+) bowel sounds, (-) guarding, (-) rebound (-) rigidity  EXTREMITIES:  no cyanosis / clubbing / edema.  L knee immobilizer in place.   ____________________     VITALS:  Vital Signs Last 24 Hrs  T(C): 36.5 (2023 10:10), Max: 37.5 (2023 02:19)  T(F): 97.7 (2023 10:10), Max: 99.5 (2023 02:19)  HR: 78 (2023 11:15) (67 - 84)  BP: 145/80 (2023 11:15) (120/82 - 150/83)  BP(mean): --  RR: 18 (2023 10:10) (15 - 19)  SpO2: 95% (2023 11:15) (94% - 96%)    Parameters below as of 2023 11:15  Patient On (Oxygen Delivery Method): room air     Daily Height in cm: 162.56 (2023 21:50)    Daily Weight in k.9 (2023 06:16)  CAPILLARY BLOOD GLUCOSE        I&O's Summary    2023 07:01  -  2023 16:10  --------------------------------------------------------  IN: 600 mL / OUT: 800 mL / NET: -200 mL        HISTORY:  PAST MEDICAL & SURGICAL HISTORY:  Hypertension      Hyperlipidemia      Endometrial cancer      [place] (hard of hearing)  left > right - no hearing aids      Encounter for biopsy  2014 - endometrial biopsy      Ankle fracture  left - s/p ORIF      Allergies    No Known Allergies    Intolerances       LABS:                        13.5   8.95  )-----------( 208      ( 2023 22:13 )             40.9     11-    139  |  107  |  26<H>  ----------------------------<  159<H>  3.9   |  30  |  0.85    Ca    9.0      2023 22:13  Mg     2.3     11-24    TPro  6.9  /  Alb  3.3  /  TBili  0.3  /  DBili  x   /  AST  12<L>  /  ALT  19  /  AlkPhos  83  11-24    PT/INR - ( 2023 22:13 )   PT: 10.8 sec;   INR: 0.90 ratio         PTT - ( 2023 22:13 )  PTT:28.1 sec  LIVER FUNCTIONS - ( 2023 22:13 )  Alb: 3.3 g/dL / Pro: 6.9 gm/dL / ALK PHOS: 83 U/L / ALT: 19 U/L / AST: 12 U/L / GGT: x           Urinalysis Basic - ( 2023 22:13 )    Color: x / Appearance: x / SG: x / pH: x  Gluc: 159 mg/dL / Ketone: x  / Bili: x / Urobili: x   Blood: x / Protein: x / Nitrite: x   Leuk Esterase: x / RBC: x / WBC x   Sq Epi: x / Non Sq Epi: x / Bacteria: x              MEDICATIONS:  MEDICATIONS  (STANDING):  aspirin enteric coated 325 milliGRAM(s) Oral daily  calcium carbonate 1250 mG  + Vitamin D (OsCal 500 + D) 1 Tablet(s) Oral daily  chlorhexidine 2% Cloths 1 Application(s) Topical <User Schedule>  cholecalciferol 1000 Unit(s) Oral daily  losartan 100 milliGRAM(s) Oral daily  multivitamin 1 Tablet(s) Oral daily  NIFEdipine XL 30 milliGRAM(s) Oral daily  pantoprazole    Tablet 40 milliGRAM(s) Oral before breakfast  simvastatin 10 milliGRAM(s) Oral at bedtime    MEDICATIONS  (PRN):  acetaminophen     Tablet .. 650 milliGRAM(s) Oral every 6 hours PRN Mild Pain (1 - 3)  
                          Patient: EMILY PIMENTEL [id number] 88y Female                            Hospitalist Attending Note    No complaints.  No pain / weakness / numbness.     ____________________PHYSICAL EXAM:  GENERAL:  NAD Alert and Oriented x 3   HEENT: NCAT  CARDIOVASCULAR:  S1, S2  LUNGS: CTAB  ABDOMEN:  soft, (-) tenderness, (-) distension, (+) bowel sounds, (-) guarding, (-) rebound (-) rigidity  EXTREMITIES:  no cyanosis / clubbing / edema.  L knee immobilizer in place.   ____________________    VITALS:  Vital Signs Last 24 Hrs  T(C): 36.4 (2023 16:03), Max: 37.1 (2023 23:27)  T(F): 97.5 (2023 16:03), Max: 98.8 (2023 04:55)  HR: 76 (2023 16:03) (69 - 88)  BP: 126/77 (2023 16:03) (116/70 - 143/85)  BP(mean): --  RR: 18 (2023 16:03) (16 - 18)  SpO2: 95% (2023 16:03) (94% - 96%)    Parameters below as of 2023 16:03  Patient On (Oxygen Delivery Method): room air     Daily     Daily Weight in k.8 (2023 04:55)  CAPILLARY BLOOD GLUCOSE        I&O's Summary    2023 07:01  -  2023 07:00  --------------------------------------------------------  IN: 0 mL / OUT: 700 mL / NET: -700 mL        LABS:                        11.9   8.03  )-----------( 168      ( 2023 07:30 )             36.9     11-27    140  |  107  |  21  ----------------------------<  118<H>  3.4<L>   |  28  |  0.63    Ca    8.7      2023 07:30          Urinalysis Basic - ( 2023 07:30 )    Color: x / Appearance: x / SG: x / pH: x  Gluc: 118 mg/dL / Ketone: x  / Bili: x / Urobili: x   Blood: x / Protein: x / Nitrite: x   Leuk Esterase: x / RBC: x / WBC x   Sq Epi: x / Non Sq Epi: x / Bacteria: x              MEDICATIONS:  acetaminophen     Tablet .. 650 milliGRAM(s) Oral every 6 hours PRN  aspirin enteric coated 325 milliGRAM(s) Oral daily  bisacodyl 5 milliGRAM(s) Oral every 12 hours PRN  calcium carbonate 1250 mG  + Vitamin D (OsCal 500 + D) 1 Tablet(s) Oral daily  chlorhexidine 2% Cloths 1 Application(s) Topical <User Schedule>  cholecalciferol 1000 Unit(s) Oral daily  influenza  Vaccine (HIGH DOSE) 0.7 milliLiter(s) IntraMuscular once  losartan 100 milliGRAM(s) Oral daily  multivitamin 1 Tablet(s) Oral daily  NIFEdipine XL 30 milliGRAM(s) Oral daily  oxycodone    5 mG/acetaminophen 325 mG 2 Tablet(s) Oral every 6 hours PRN  pantoprazole    Tablet 40 milliGRAM(s) Oral before breakfast  polyethylene glycol 3350 17 Gram(s) Oral daily  senna 2 Tablet(s) Oral at bedtime  simvastatin 10 milliGRAM(s) Oral at bedtime    
PROGRESS NOTE:     Patient is a 88y old  Female who presents with a chief complaint of L knee pain (02 Dec 2023 11:46)      SUBJECTIVE / OVERNIGHT EVENTS: NO acute overnight events. Patient feeling well, no new complaints        MEDICATIONS  (STANDING):  aspirin enteric coated 325 milliGRAM(s) Oral daily  calcium carbonate 1250 mG  + Vitamin D (OsCal 500 + D) 1 Tablet(s) Oral daily  chlorhexidine 2% Cloths 1 Application(s) Topical <User Schedule>  cholecalciferol 1000 Unit(s) Oral daily  influenza  Vaccine (HIGH DOSE) 0.7 milliLiter(s) IntraMuscular once  losartan 100 milliGRAM(s) Oral daily  multivitamin 1 Tablet(s) Oral daily  NIFEdipine XL 30 milliGRAM(s) Oral daily  pantoprazole    Tablet 40 milliGRAM(s) Oral before breakfast  senna 2 Tablet(s) Oral at bedtime  simvastatin 10 milliGRAM(s) Oral at bedtime    MEDICATIONS  (PRN):  acetaminophen     Tablet .. 650 milliGRAM(s) Oral every 6 hours PRN Mild Pain (1 - 3)  bisacodyl 5 milliGRAM(s) Oral every 12 hours PRN Constipation  oxycodone    5 mG/acetaminophen 325 mG 2 Tablet(s) Oral every 6 hours PRN Mod to severe pain 4-10      CAPILLARY BLOOD GLUCOSE        I&O's Summary    02 Dec 2023 07:01  -  03 Dec 2023 07:00  --------------------------------------------------------  IN: 700 mL / OUT: 700 mL / NET: 0 mL        PHYSICAL EXAM:  Vital Signs Last 24 Hrs  T(C): 36.4 (03 Dec 2023 11:10), Max: 36.7 (02 Dec 2023 15:34)  T(F): 97.6 (03 Dec 2023 11:10), Max: 98.1 (02 Dec 2023 23:16)  HR: 77 (03 Dec 2023 11:10) (59 - 84)  BP: 118/62 (03 Dec 2023 11:10) (118/62 - 139/80)  BP(mean): --  RR: 18 (03 Dec 2023 11:10) (18 - 18)  SpO2: 93% (03 Dec 2023 11:10) (93% - 95%)    Parameters below as of 03 Dec 2023 04:29  Patient On (Oxygen Delivery Method): room air        GENERAL:  NAD Alert and Oriented x 3   HEENT: NCAT  CARDIOVASCULAR:  S1, S2  LUNGS: CTAB  ABDOMEN:  soft, (-) tenderness, (-) distension, (+) bowel sounds, (-) guarding, (-) rebound (-) rigidity  EXTREMITIES:  no cyanosis / clubbing / edema.  L knee immobilizer in place.                     RADIOLOGY & ADDITIONAL TESTS:  Results Reviewed:   Imaging Personally Reviewed:  Electrocardiogram Personally Reviewed:    COORDINATION OF CARE:  Care Discussed with Consultants/Other Providers [Y/N]:  Prior or Outpatient Records Reviewed [Y/N]:  
                          Patient: EMILY PIMENTEL [id number] 88y Female                            Hospitalist Attending Note    No complaints.  No pain / weakness / numbness.     ____________________PHYSICAL EXAM:  GENERAL:  NAD Alert and Oriented x 3   HEENT: NCAT  CARDIOVASCULAR:  S1, S2  LUNGS: CTAB  ABDOMEN:  soft, (-) tenderness, (-) distension, (+) bowel sounds, (-) guarding, (-) rebound (-) rigidity  EXTREMITIES:  no cyanosis / clubbing / edema.  L knee immobilizer in place.   ____________________    VITALS:  Vital Signs Last 24 Hrs  T(C): 37 (01 Dec 2023 10:59), Max: 37.3 (2023 23:28)  T(F): 98.6 (01 Dec 2023 10:59), Max: 99.1 (2023 23:28)  HR: 80 (01 Dec 2023 10:59) (68 - 80)  BP: 116/73 (01 Dec 2023 10:59) (116/73 - 135/79)  BP(mean): --  RR: 18 (01 Dec 2023 10:59) (16 - 18)  SpO2: 96% (01 Dec 2023 10:59) (94% - 96%)    Parameters below as of 01 Dec 2023 10:59  Patient On (Oxygen Delivery Method): room air     Daily     Daily Weight in k.8 (01 Dec 2023 04:25)  CAPILLARY BLOOD GLUCOSE        I&O's Summary    2023 07:01  -  01 Dec 2023 07:00  --------------------------------------------------------  IN: 474 mL / OUT: 900 mL / NET: -426 mL    01 Dec 2023 07:01  -  01 Dec 2023 16:05  --------------------------------------------------------  IN: 320 mL / OUT: 0 mL / NET: 320 mL        LABS:                        12.8   7.93  )-----------( 218      ( 2023 07:00 )             38.8     11-30    143  |  108  |  17  ----------------------------<  109<H>  3.6   |  30  |  0.61    Ca    9.1      2023 07:00          Urinalysis Basic - ( 2023 07:00 )    Color: x / Appearance: x / SG: x / pH: x  Gluc: 109 mg/dL / Ketone: x  / Bili: x / Urobili: x   Blood: x / Protein: x / Nitrite: x   Leuk Esterase: x / RBC: x / WBC x   Sq Epi: x / Non Sq Epi: x / Bacteria: x              MEDICATIONS:  acetaminophen     Tablet .. 650 milliGRAM(s) Oral every 6 hours PRN  aspirin enteric coated 325 milliGRAM(s) Oral daily  bisacodyl 5 milliGRAM(s) Oral every 12 hours PRN  calcium carbonate 1250 mG  + Vitamin D (OsCal 500 + D) 1 Tablet(s) Oral daily  chlorhexidine 2% Cloths 1 Application(s) Topical <User Schedule>  cholecalciferol 1000 Unit(s) Oral daily  influenza  Vaccine (HIGH DOSE) 0.7 milliLiter(s) IntraMuscular once  losartan 100 milliGRAM(s) Oral daily  multivitamin 1 Tablet(s) Oral daily  NIFEdipine XL 30 milliGRAM(s) Oral daily  oxycodone    5 mG/acetaminophen 325 mG 2 Tablet(s) Oral every 6 hours PRN  pantoprazole    Tablet 40 milliGRAM(s) Oral before breakfast  senna 2 Tablet(s) Oral at bedtime  simvastatin 10 milliGRAM(s) Oral at bedtime    
PROGRESS NOTE:     Patient is a 88y old  Female who presents with a chief complaint of L knee pain (01 Dec 2023 16:04)      SUBJECTIVE / OVERNIGHT EVENTS: No acute overnight events. Patient states she feels well, no new complaints.       MEDICATIONS  (STANDING):  aspirin enteric coated 325 milliGRAM(s) Oral daily  calcium carbonate 1250 mG  + Vitamin D (OsCal 500 + D) 1 Tablet(s) Oral daily  chlorhexidine 2% Cloths 1 Application(s) Topical <User Schedule>  cholecalciferol 1000 Unit(s) Oral daily  influenza  Vaccine (HIGH DOSE) 0.7 milliLiter(s) IntraMuscular once  losartan 100 milliGRAM(s) Oral daily  multivitamin 1 Tablet(s) Oral daily  NIFEdipine XL 30 milliGRAM(s) Oral daily  pantoprazole    Tablet 40 milliGRAM(s) Oral before breakfast  senna 2 Tablet(s) Oral at bedtime  simvastatin 10 milliGRAM(s) Oral at bedtime    MEDICATIONS  (PRN):  acetaminophen     Tablet .. 650 milliGRAM(s) Oral every 6 hours PRN Mild Pain (1 - 3)  bisacodyl 5 milliGRAM(s) Oral every 12 hours PRN Constipation  oxycodone    5 mG/acetaminophen 325 mG 2 Tablet(s) Oral every 6 hours PRN Mod to severe pain 4-10      CAPILLARY BLOOD GLUCOSE        I&O's Summary    01 Dec 2023 07:01  -  02 Dec 2023 07:00  --------------------------------------------------------  IN: 320 mL / OUT: 0 mL / NET: 320 mL        PHYSICAL EXAM:  Vital Signs Last 24 Hrs  T(C): 36.9 (02 Dec 2023 10:38), Max: 37 (01 Dec 2023 23:35)  T(F): 98.4 (02 Dec 2023 10:38), Max: 98.6 (01 Dec 2023 23:35)  HR: 84 (02 Dec 2023 10:38) (73 - 94)  BP: 132/77 (02 Dec 2023 10:38) (116/71 - 132/77)  BP(mean): --  RR: 18 (02 Dec 2023 10:38) (18 - 19)  SpO2: 94% (02 Dec 2023 10:38) (93% - 97%)    Parameters below as of 02 Dec 2023 04:45  Patient On (Oxygen Delivery Method): room air        GENERAL:  NAD Alert and Oriented x 3   HEENT: NCAT  CARDIOVASCULAR:  S1, S2  LUNGS: CTAB  ABDOMEN:  soft, (-) tenderness, (-) distension, (+) bowel sounds, (-) guarding, (-) rebound (-) rigidity  EXTREMITIES:  no cyanosis / clubbing / edema.  L knee immobilizer in place.                             RADIOLOGY & ADDITIONAL TESTS:  Results Reviewed:   Imaging Personally Reviewed:  Electrocardiogram Personally Reviewed:    COORDINATION OF CARE:  Care Discussed with Consultants/Other Providers [Y/N]:  Prior or Outpatient Records Reviewed [Y/N]:  
                          Patient: EMILY PIMENTEL [id number] 88y Female                            Hospitalist Attending Note    No complaints.  No pain / weakness / numbness.     ____________________PHYSICAL EXAM:  GENERAL:  NAD Alert and Oriented x 3   HEENT: NCAT  CARDIOVASCULAR:  S1, S2  LUNGS: CTAB  ABDOMEN:  soft, (-) tenderness, (-) distension, (+) bowel sounds, (-) guarding, (-) rebound (-) rigidity  EXTREMITIES:  no cyanosis / clubbing / edema.  L knee immobilizer in place.   ____________________    VITALS:  Vital Signs Last 24 Hrs  T(C): 36.4 (2023 10:42), Max: 37.2 (2023 23:25)  T(F): 97.6 (2023 10:42), Max: 98.9 (2023 23:25)  HR: 80 (2023 10:42) (64 - 80)  BP: 132/81 (2023 10:42) (121/55 - 132/81)  BP(mean): --  RR: 18 (2023 10:42) (16 - 18)  SpO2: 94% (2023 10:42) (93% - 95%)    Parameters below as of 2023 04:27  Patient On (Oxygen Delivery Method): room air     Daily     Daily Weight in k.2 (2023 04:27)  CAPILLARY BLOOD GLUCOSE        I&O's Summary    2023 07:01  -  2023 07:00  --------------------------------------------------------  IN: 0 mL / OUT: 300 mL / NET: -300 mL        LABS:                        MEDICATIONS:  acetaminophen     Tablet .. 650 milliGRAM(s) Oral every 6 hours PRN  aspirin enteric coated 325 milliGRAM(s) Oral daily  bisacodyl 5 milliGRAM(s) Oral every 12 hours PRN  calcium carbonate 1250 mG  + Vitamin D (OsCal 500 + D) 1 Tablet(s) Oral daily  chlorhexidine 2% Cloths 1 Application(s) Topical <User Schedule>  cholecalciferol 1000 Unit(s) Oral daily  influenza  Vaccine (HIGH DOSE) 0.7 milliLiter(s) IntraMuscular once  losartan 100 milliGRAM(s) Oral daily  multivitamin 1 Tablet(s) Oral daily  NIFEdipine XL 30 milliGRAM(s) Oral daily  oxycodone    5 mG/acetaminophen 325 mG 2 Tablet(s) Oral every 6 hours PRN  pantoprazole    Tablet 40 milliGRAM(s) Oral before breakfast  polyethylene glycol 3350 17 Gram(s) Oral daily  senna 2 Tablet(s) Oral at bedtime  simvastatin 10 milliGRAM(s) Oral at bedtime

## 2023-12-03 NOTE — PROGRESS NOTE ADULT - REASON FOR ADMISSION
L knee pain

## 2023-12-03 NOTE — PROGRESS NOTE ADULT - PROVIDER SPECIALTY LIST ADULT
Hospitalist
Internal Medicine
Hospitalist
Internal Medicine

## 2023-12-04 ENCOUNTER — TRANSCRIPTION ENCOUNTER (OUTPATIENT)
Age: 88
End: 2023-12-04

## 2023-12-04 VITALS
RESPIRATION RATE: 16 BRPM | DIASTOLIC BLOOD PRESSURE: 84 MMHG | HEART RATE: 88 BPM | TEMPERATURE: 99 F | OXYGEN SATURATION: 94 % | SYSTOLIC BLOOD PRESSURE: 131 MMHG

## 2023-12-04 LAB
FLUAV AG NPH QL: SIGNIFICANT CHANGE UP
FLUAV AG NPH QL: SIGNIFICANT CHANGE UP
FLUBV AG NPH QL: SIGNIFICANT CHANGE UP
FLUBV AG NPH QL: SIGNIFICANT CHANGE UP
SARS-COV-2 RNA SPEC QL NAA+PROBE: SIGNIFICANT CHANGE UP
SARS-COV-2 RNA SPEC QL NAA+PROBE: SIGNIFICANT CHANGE UP

## 2023-12-04 PROCEDURE — 99239 HOSP IP/OBS DSCHRG MGMT >30: CPT

## 2023-12-04 RX ORDER — SENNA PLUS 8.6 MG/1
2 TABLET ORAL
Qty: 0 | Refills: 0 | DISCHARGE
Start: 2023-12-04

## 2023-12-04 RX ADMIN — PANTOPRAZOLE SODIUM 40 MILLIGRAM(S): 20 TABLET, DELAYED RELEASE ORAL at 05:45

## 2023-12-04 RX ADMIN — INFLUENZA VIRUS VACCINE 0.7 MILLILITER(S): 15; 15; 15; 15 SUSPENSION INTRAMUSCULAR at 10:21

## 2023-12-04 RX ADMIN — Medication 325 MILLIGRAM(S): at 11:05

## 2023-12-04 RX ADMIN — LOSARTAN POTASSIUM 100 MILLIGRAM(S): 100 TABLET, FILM COATED ORAL at 05:26

## 2023-12-04 RX ADMIN — Medication 1 TABLET(S): at 11:05

## 2023-12-04 RX ADMIN — Medication 1000 UNIT(S): at 11:05

## 2023-12-04 RX ADMIN — CHLORHEXIDINE GLUCONATE 1 APPLICATION(S): 213 SOLUTION TOPICAL at 05:45

## 2023-12-04 RX ADMIN — Medication 30 MILLIGRAM(S): at 05:26

## 2023-12-04 RX ADMIN — Medication 1 TABLET(S): at 11:06

## 2023-12-04 NOTE — DISCHARGE NOTE NURSING/CASE MANAGEMENT/SOCIAL WORK - NSDCPEFALRISK_GEN_ALL_CORE
For information on Fall & Injury Prevention, visit: [email or web address] OR  [email or web address] OR  https://www.cdc.gov/steadi/patient.html For information on Fall & Injury Prevention, visit: [email or web address] OR  [email or web address] OR  https://www.cdc.gov/steadi/patient.html

## 2023-12-04 NOTE — DISCHARGE NOTE NURSING/CASE MANAGEMENT/SOCIAL WORK - PATIENT PORTAL LINK FT
You can access the FollowMyHealth Patient Portal offered by [facility] by registering at the following website: [email or web address] By joining [employer]’s FollowMyHealth portal, you will also be able to view your health information using other applications (apps) compatible with our system. You can access the FollowMyHealth Patient Portal offered by [facility] by registering at the following website: [email or web address] By joining [employer]’s FollowMyHealth portal, you will also be able to view your health information using other applications (apps) compatible with our system.

## 2023-12-04 NOTE — DISCHARGE NOTE NURSING/CASE MANAGEMENT/SOCIAL WORK - NSDCVIVACCINE_GEN_ALL_CORE_FT
influenza, high-dose, quadrivalent; 04-Dec-2023 10:21; Asaf Dickinson (RN); [employer]; [id number] (Exp. Date: 04-Jun-2024); IntraMuscular; Deltoid Right.; 0.7 milliLiter(s); VIS (VIS Published: 06-Aug-2021, VIS Presented: 04-Dec-2023);    influenza, high-dose, quadrivalent; 04-Dec-2023 10:21; Asaf Dickinson (RN); [employer]; [id number] (Exp. Date: 04-Jun-2024); IntraMuscular; Deltoid Right.; 0.7 milliLiter(s); VIS (VIS Published: 06-Aug-2021, VIS Presented: 04-Dec-2023);

## 2023-12-07 DIAGNOSIS — E78.5 HYPERLIPIDEMIA, UNSPECIFIED: ICD-10-CM

## 2023-12-07 DIAGNOSIS — C54.1 MALIGNANT NEOPLASM OF ENDOMETRIUM: ICD-10-CM

## 2023-12-07 DIAGNOSIS — E87.6 HYPOKALEMIA: ICD-10-CM

## 2023-12-07 DIAGNOSIS — H91.93 UNSPECIFIED HEARING LOSS, BILATERAL: ICD-10-CM
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
## 2023-12-07 DIAGNOSIS — S82.142A DISPLACED BICONDYLAR FRACTURE OF LEFT TIBIA, INITIAL ENCOUNTER FOR CLOSED FRACTURE: ICD-10-CM

## 2023-12-07 DIAGNOSIS — I10 ESSENTIAL (PRIMARY) HYPERTENSION: ICD-10-CM

## 2024-04-02 ENCOUNTER — NON-APPOINTMENT (OUTPATIENT)
Age: 89
End: 2024-04-02

## 2024-04-02 ENCOUNTER — APPOINTMENT (OUTPATIENT)
Dept: ELECTROPHYSIOLOGY | Facility: CLINIC | Age: 89
End: 2024-04-02
Payer: MEDICARE

## 2024-04-02 VITALS
HEIGHT: 62 IN | DIASTOLIC BLOOD PRESSURE: 74 MMHG | SYSTOLIC BLOOD PRESSURE: 118 MMHG | HEART RATE: 84 BPM | WEIGHT: 135 LBS | BODY MASS INDEX: 24.84 KG/M2 | OXYGEN SATURATION: 96 %

## 2024-04-02 PROCEDURE — 93000 ELECTROCARDIOGRAM COMPLETE: CPT

## 2024-04-02 PROCEDURE — 99203 OFFICE O/P NEW LOW 30 MIN: CPT

## 2024-04-02 NOTE — HISTORY OF PRESENT ILLNESS
[FreeTextEntry1] : Ms. EMILY PIMENTEL is an 88-year-old woman with past medical history of HTN, HLD, GERD, COPD, Uterine CA, who was referred by Dr. Caraballo because of recurrent syncope and possible ILR.  Her first episode of syncope was when she was in her 20s when she was home alone, and a sensation came over her and she passed out.  Since then, she has had multiple episodes of syncope, sometimes without warning and sometimes with prodrome.  About 50% of the time, the episodes begin with lightheadedness. At times the episodes of syncope are associated with urinary incontinence and vomiting. She underwent a stress test and echocardiogram both of which were unremarkable. Monitor showed sinus rhythm and tachycardia max 120s. Echo: 2/24 EF > 55. No AS, mild AI, MR. NST: 3/24. No ischemia. Father and sister both have a history of recurrent syncope. The episodes of syncope occur while she is sitting or standing, not lying down. Has had syncope with trauma with at least one episode of facial trauma when she fell in the street. Prior syncope has resulted in bruising but never fracture. Dr. Hagen ordered an MRI. Drinks wine but no association with syncope. She has had syncope when it was hot outside but also during normal conditions in her apartment.

## 2024-04-02 NOTE — REASON FOR VISIT
[Arrhythmia/ECG Abnorrmalities] : arrhythmia/ECG abnormalities [FreeTextEntry3] : Riccardo Caraballo MD

## 2024-04-02 NOTE — PHYSICAL EXAM
[Well Developed] : well developed [Well Nourished] : well nourished [No Acute Distress] : no acute distress [Normal Conjunctiva] : normal conjunctiva [Normal Venous Pressure] : normal venous pressure [No Murmur] : no murmur [Normal S1, S2] : normal S1, S2 [No Carotid Bruit] : no carotid bruit [No Rub] : no rub [No Gallop] : no gallop [No Respiratory Distress] : no respiratory distress  [Clear Lung Fields] : clear lung fields [Good Air Entry] : good air entry [No Masses/organomegaly] : no masses/organomegaly [Soft] : abdomen soft [Non Tender] : non-tender [Normal Bowel Sounds] : normal bowel sounds [Normal Gait] : normal gait [No Clubbing] : no clubbing [No Edema] : no edema [No Cyanosis] : no cyanosis [No Varicosities] : no varicosities [No Rash] : no rash [No Skin Lesions] : no skin lesions [Moves all extremities] : moves all extremities [No Focal Deficits] : no focal deficits [Normal Speech] : normal speech [Alert and Oriented] : alert and oriented [Normal memory] : normal memory

## 2024-04-02 NOTE — DISCUSSION/SUMMARY
[FreeTextEntry1] : 1. Syncope.  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  I recommended that the patient undergo implantation of a loop recorder. Patient will discuss with daughter. ECG performed today to check for bundle branch block or evidence of sinus node dysfunction revealed sinus rhythm.  [EKG obtained to assist in diagnosis and management of assessed problem(s)] : EKG obtained to assist in diagnosis and management of assessed problem(s)

## 2024-07-18 ENCOUNTER — APPOINTMENT (OUTPATIENT)
Dept: GYNECOLOGIC ONCOLOGY | Facility: CLINIC | Age: 89
End: 2024-07-18
Payer: MEDICARE

## 2024-07-18 VITALS
WEIGHT: 128 LBS | TEMPERATURE: 97.1 F | OXYGEN SATURATION: 97 % | SYSTOLIC BLOOD PRESSURE: 130 MMHG | RESPIRATION RATE: 17 BRPM | BODY MASS INDEX: 23.55 KG/M2 | HEIGHT: 62 IN | DIASTOLIC BLOOD PRESSURE: 84 MMHG | HEART RATE: 80 BPM

## 2024-07-18 DIAGNOSIS — C54.1 MALIGNANT NEOPLASM OF ENDOMETRIUM: ICD-10-CM

## 2024-07-18 PROCEDURE — 99213 OFFICE O/P EST LOW 20 MIN: CPT

## 2024-07-18 PROCEDURE — 99459 PELVIC EXAMINATION: CPT

## 2025-01-20 ENCOUNTER — EMERGENCY (EMERGENCY)
Facility: HOSPITAL | Age: 89
LOS: 0 days | Discharge: HOME | End: 2025-01-20
Attending: STUDENT IN AN ORGANIZED HEALTH CARE EDUCATION/TRAINING PROGRAM
Payer: MEDICARE

## 2025-01-20 VITALS
RESPIRATION RATE: 18 BRPM | WEIGHT: 145.06 LBS | HEIGHT: 64 IN | DIASTOLIC BLOOD PRESSURE: 77 MMHG | TEMPERATURE: 98 F | OXYGEN SATURATION: 98 % | HEART RATE: 75 BPM | SYSTOLIC BLOOD PRESSURE: 140 MMHG

## 2025-01-20 DIAGNOSIS — R55 SYNCOPE AND COLLAPSE: ICD-10-CM

## 2025-01-20 DIAGNOSIS — S82.899A OTHER FRACTURE OF UNSPECIFIED LOWER LEG, INITIAL ENCOUNTER FOR CLOSED FRACTURE: Chronic | ICD-10-CM

## 2025-01-20 DIAGNOSIS — Z01.818 ENCOUNTER FOR OTHER PREPROCEDURAL EXAMINATION: Chronic | ICD-10-CM

## 2025-01-20 DIAGNOSIS — I10 ESSENTIAL (PRIMARY) HYPERTENSION: ICD-10-CM

## 2025-01-20 DIAGNOSIS — E78.5 HYPERLIPIDEMIA, UNSPECIFIED: ICD-10-CM

## 2025-01-20 LAB
ALBUMIN SERPL ELPH-MCNC: 3.6 G/DL — SIGNIFICANT CHANGE UP (ref 3.3–5)
ALP SERPL-CCNC: 84 U/L — SIGNIFICANT CHANGE UP (ref 40–120)
ALT FLD-CCNC: 16 U/L — SIGNIFICANT CHANGE UP (ref 12–78)
ANION GAP SERPL CALC-SCNC: 7 MMOL/L — SIGNIFICANT CHANGE UP (ref 5–17)
AST SERPL-CCNC: 15 U/L — SIGNIFICANT CHANGE UP (ref 15–37)
BASOPHILS # BLD AUTO: 0.04 K/UL — SIGNIFICANT CHANGE UP (ref 0–0.2)
BASOPHILS NFR BLD AUTO: 0.3 % — SIGNIFICANT CHANGE UP (ref 0–2)
BILIRUB SERPL-MCNC: 0.6 MG/DL — SIGNIFICANT CHANGE UP (ref 0.2–1.2)
BUN SERPL-MCNC: 16 MG/DL — SIGNIFICANT CHANGE UP (ref 7–23)
CALCIUM SERPL-MCNC: 9.1 MG/DL — SIGNIFICANT CHANGE UP (ref 8.5–10.1)
CHLORIDE SERPL-SCNC: 106 MMOL/L — SIGNIFICANT CHANGE UP (ref 96–108)
CO2 SERPL-SCNC: 26 MMOL/L — SIGNIFICANT CHANGE UP (ref 22–31)
CREAT SERPL-MCNC: 0.81 MG/DL — SIGNIFICANT CHANGE UP (ref 0.5–1.3)
EGFR: 70 ML/MIN/1.73M2 — SIGNIFICANT CHANGE UP
EOSINOPHIL # BLD AUTO: 0.04 K/UL — SIGNIFICANT CHANGE UP (ref 0–0.5)
EOSINOPHIL NFR BLD AUTO: 0.3 % — SIGNIFICANT CHANGE UP (ref 0–6)
GLUCOSE SERPL-MCNC: 120 MG/DL — HIGH (ref 70–99)
HCT VFR BLD CALC: 44.8 % — SIGNIFICANT CHANGE UP (ref 34.5–45)
HGB BLD-MCNC: 14.8 G/DL — SIGNIFICANT CHANGE UP (ref 11.5–15.5)
IMM GRANULOCYTES NFR BLD AUTO: 0.4 % — SIGNIFICANT CHANGE UP (ref 0–0.9)
LYMPHOCYTES # BLD AUTO: 1.32 K/UL — SIGNIFICANT CHANGE UP (ref 1–3.3)
LYMPHOCYTES # BLD AUTO: 10.8 % — LOW (ref 13–44)
MCHC RBC-ENTMCNC: 31.6 PG — SIGNIFICANT CHANGE UP (ref 27–34)
MCHC RBC-ENTMCNC: 33 G/DL — SIGNIFICANT CHANGE UP (ref 32–36)
MCV RBC AUTO: 95.5 FL — SIGNIFICANT CHANGE UP (ref 80–100)
MONOCYTES # BLD AUTO: 1.01 K/UL — HIGH (ref 0–0.9)
MONOCYTES NFR BLD AUTO: 8.3 % — SIGNIFICANT CHANGE UP (ref 2–14)
NEUTROPHILS # BLD AUTO: 9.73 K/UL — HIGH (ref 1.8–7.4)
NEUTROPHILS NFR BLD AUTO: 79.9 % — HIGH (ref 43–77)
NRBC # BLD: 0 /100 WBCS — SIGNIFICANT CHANGE UP (ref 0–0)
PLATELET # BLD AUTO: 207 K/UL — SIGNIFICANT CHANGE UP (ref 150–400)
POTASSIUM SERPL-MCNC: 4 MMOL/L — SIGNIFICANT CHANGE UP (ref 3.5–5.3)
POTASSIUM SERPL-SCNC: 4 MMOL/L — SIGNIFICANT CHANGE UP (ref 3.5–5.3)
PROT SERPL-MCNC: 7.4 GM/DL — SIGNIFICANT CHANGE UP (ref 6–8.3)
RBC # BLD: 4.69 M/UL — SIGNIFICANT CHANGE UP (ref 3.8–5.2)
RBC # FLD: 13 % — SIGNIFICANT CHANGE UP (ref 10.3–14.5)
SODIUM SERPL-SCNC: 139 MMOL/L — SIGNIFICANT CHANGE UP (ref 135–145)
TROPONIN I, HIGH SENSITIVITY RESULT: 13 NG/L — SIGNIFICANT CHANGE UP
WBC # BLD: 12.19 K/UL — HIGH (ref 3.8–10.5)
WBC # FLD AUTO: 12.19 K/UL — HIGH (ref 3.8–10.5)

## 2025-01-20 PROCEDURE — 99284 EMERGENCY DEPT VISIT MOD MDM: CPT

## 2025-01-20 PROCEDURE — 93010 ELECTROCARDIOGRAM REPORT: CPT | Mod: 76

## 2025-01-20 PROCEDURE — 70450 CT HEAD/BRAIN W/O DYE: CPT | Mod: 26

## 2025-01-20 NOTE — ED ADULT TRIAGE NOTE - GLASGOW COMA SCALE: EYE OPENING, MLM
Patient arrives with father with c/o fever, nasal congestion and cough. Nothing given for fever at home. Temp was 101.3. Father reports mother was diagnosed with the flu yesterday. (E4) spontaneous

## 2025-01-20 NOTE — ED PROVIDER NOTE - PHYSICAL EXAMINATION
General: Appears well and nontoxic  Mentation: A&O x 3  psych: mood appropriate  HEENT: airway patent, conjunctivae clear bilaterally  Resp: symmetric chest rise, no resp distress, breath sounds CTA bilaterally  Cardio: RRR, no m/r/g  GI: soft/nondistended/nontender  Neuro: sensation and motor function grossly intact  Skin: no cyanosis, no jaundice  MSK: normal movement of all extremities, no C-spine, T-spine, L-spine midline tenderness, pelvis intact, full range of motion of upper and lower extremities  Lymph/Vasc: no NICK edema

## 2025-01-20 NOTE — ED PROVIDER NOTE - NSFOLLOWUPINSTRUCTIONS_ED_ALL_ED_FT
Please follow up with your primary care physician within the next 4-6 days.    Please follow up with a Cardiologist within the next 4-6 days.    Please continue taking your medications as prescribed by your doctors.     Please return to the emergency department if you experience any of the following symptoms:    Fever  Chest pain  Difficulty breathing  Abdominal pain  Nausea  Vomiting

## 2025-01-20 NOTE — ED ADULT NURSE NOTE - NS_NURSE_DISC_TEACHING_YN_ED_ALL_ED
Patient: Samantha Tavares    Procedure: Procedure(s):  bilateral upper lid blepharopalsty       Anesthesia Type:  MAC    Note:  Disposition: Outpatient   Postop Pain Control: Uneventful            Sign Out: Well controlled pain   PONV: No   Neuro/Psych: Uneventful            Sign Out: Acceptable/Baseline neuro status   Airway/Respiratory: Uneventful            Sign Out: Acceptable/Baseline resp. status   CV/Hemodynamics: Uneventful            Sign Out: Acceptable CV status; No obvious hypovolemia; No obvious fluid overload   Other NRE: NONE   DID A NON-ROUTINE EVENT OCCUR? No           Last vitals:  Vitals Value Taken Time   BP 97/60 09/07/22 1230   Temp 36.1  C (97  F) 09/07/22 1210   Pulse 58 09/07/22 1230   Resp 14 09/07/22 1230   SpO2 99 % 09/07/22 1230       Electronically Signed By: Donovan Vail MD  September 7, 2022  1:08 PM   Yes

## 2025-01-20 NOTE — ED ADULT NURSE NOTE - NSICDXPASTMEDICALHX_GEN_ALL_CORE_FT
PAST MEDICAL HISTORY:  Endometrial cancer     [place] (hard of hearing) left > right - no hearing aids    Hyperlipidemia     Hypertension

## 2025-01-20 NOTE — ED ADULT NURSE NOTE - NURSING NEURO ORIENTATION
Pt punched a metal gate with right hand, no deformity noted or bruises.  pt co pain to wrist. Pt from group home.
oriented to person, place and time

## 2025-01-20 NOTE — ED ADULT NURSE NOTE - BEFAST FACE
Monitor BP 1-2 times weekly for the next 4-8 weeks.  If not improving return for recheck earlier than planned  Goal for BP< 130/80  Alternate ice & heat.  Use Ice massage on trigger point areas.  Do gentle Range of motion exercises  
No

## 2025-01-20 NOTE — ED PROVIDER NOTE - NSICDXPASTMEDICALHX_GEN_ALL_CORE_FT
PAST MEDICAL HISTORY:  Endometrial cancer     [place] (hard of hearing) left > right - no hearing aids    Hyperlipidemia     Hypertension

## 2025-01-20 NOTE — ED PROVIDER NOTE - OBJECTIVE STATEMENT
You have been given emergency department evaluation.  This evaluation is intended to rule out life-threatening conditions.  Is not a complete evaluation.  You could require further testing as determined by your primary care physician or any referred specialist.  Please follow-up with all doctors that you are referred to.  Please be sure to take your prescribed medications and follow any specific instructions in the discharge instructions.  Please follow-up with your primary care physician within 48 hours.  Please have your primary care provider recheck your blood pressure.  Rest.  Drink plenty of fluids, stay hydrated.  Increase fiber in your diet.  Please return to the emergency department if you experience chest pain, shortness of breath, abdominal pain, fever greater than 102, intractable vomiting.  Please return to the emergency department if your symptoms continue or worsen, or if you begin to experience any other concerning symptom.     See MDM:

## 2025-01-20 NOTE — ED PROVIDER NOTE - NSFOLLOWUPCLINICS_GEN_ALL_ED_FT
Cardiology at [facility]  Cardiology  [address] [place] [address]  Phone: [phone]  Fax:     [facility]  Cardiology  [address] [address], [address]  [place] [address]  Phone: [phone]  Fax:

## 2025-01-20 NOTE — ED PROVIDER NOTE - CLINICAL SUMMARY MEDICAL DECISION MAKING FREE TEXT BOX
89-year-old female with a past medical history of hypertension, hyperlipidemia presenting with syncopal episode. Patient states she was walking towards her lobby from the parking lot when she felt lightheaded and lowered herself to the ground. Patient states she does not low how long she was unconscious for. Patient states she did not hit her head. Patient was helped in to the lobby of her building by 2 other residents. EMS was called. EMS states patient had additional syncopal episode while under their care. Patient states she has history of passing out but does not know the etiology. Denies headache, chest pain, palpitations, difficulty breathing, nausea, vomiting, abdominal pain, pain in the upper or lower extremities, neck pain, back pain. Physical exam reveals well-appearing female, heart rate regular, clear breath sounds bilaterally, soft nontender abdomen, no C-spine, T-spine, L-spine midline tenderness, full range of motion of upper and lower extremities, pelvis is intact. CBC, CMP, troponin, EKG, CT head.

## 2025-01-20 NOTE — ED ADULT TRIAGE NOTE - CHIEF COMPLAINT QUOTE
BIBEMS s/p syncopal episode. Per EMS pt went out her apt and passed out in parking lot, found by 2 residents and brought to lobby. EMS reports second syncope with EMS. States she has hx of passing out but doesn't know why. PMH HTN, HLD. Received 450cc NS R AC #20.

## 2025-01-20 NOTE — ED PROVIDER NOTE - PATIENT PORTAL LINK FT
You can access the FollowMyHealth Patient Portal offered by [facility] by registering at the following website: [email or web address]. By joining [facility]’s FollowMyHealth portal, you will also be able to view your health information using other applications (apps) compatible with our system.

## 2025-01-20 NOTE — ED ADULT NURSE NOTE - NS ED PATIENT SAFETY CONCERN
[facility] WOUND and HYPERBAR[facility] TREATMENT  CENTER                            Visit  Discharge Instructions / Physician Orders  DATE:9/3/24     Home Care:NONE     SUPPLIES ORDERED THRU:  NONE                 DATE LAST SUPPLIED     Wound Location:  Left foot     Cleanse with: Liquid antibacterial soap and water, rinse well      Dressing Orders:  Primary dressing  Silvercel tuck between toes    Secondary dressing   Cover with dry gauze        Frequency:  Daily     Additional Orders: Increase protein to diet (meat, cheese, eggs, fish, peanut butter, nuts and beans)  Multivitamin daily     OFFLOADING [x] YES  TYPE:                  [] NA  No pressure to area  Weekly wound care visits until determined otherwise.     Antibiotic therapy-wound care related YES [x] NO [] NA[]  Doxycycline 100 mg 2 x per day through 9/1/24  Please finish up  MY CHART []     Smart Device  []      HYPERBARIC TREATMENT-                TREATMENT #                          Your next appointment with the Wound Care Center is in 1 week Wednesday-or Thursday                                                                                                   (Please note your next appointment above and if you are unable to keep, kindly give a 24 hour notice. Thank you.)  If more than 15 min late we cannot guarantee you will be seen due to clinician schedule  Per Policy, Excessive cancellation will call for dismissal from program.  If you experience any of the following, please call the Wound Care Center during business hours:  [phone]     * Increase in Pain  * Temperature over 101  * Increase in drainage from your wound  * Drainage with a foul odor  * Bleeding  * Increase in swelling  * Need for compression bandage changes due to slippage, breakthrough drainage.     If you need medical attention outside of the business hours of the Wound Care Centers please contact your PCP or go to the nearest emergency room.     The information contained in the  No

## 2025-01-20 NOTE — ED PROVIDER NOTE - PROGRESS NOTE DETAILS
Feng, DO: Patient reassessed at bedside and feels well. Patient is ambulating without assistance. Labs are nonactionable, CT head negative for acute pathology. Patient instructed to follow-up with PCP and cardiologist.

## 2025-01-20 NOTE — ED ADULT NURSE NOTE - OBJECTIVE STATEMENT
A&OX3 patient C/O  syncopal episode states dizziness denies Ha/ Pain/   no facial droop present or slurred  speech at this  time pmh  HTN / HLD BIIBA -found on floor by neighbors denies

## 2025-01-20 NOTE — ED PROVIDER NOTE - DISCHARGE DATE
Use voltaren gel to the knee  Follow up with PCP for referral to orthopedic surgery  Return to the ER at anytime for any new or worsening conditions.   20-Jan-2025

## 2025-02-07 ENCOUNTER — NON-APPOINTMENT (OUTPATIENT)
Age: 89
End: 2025-02-07

## 2025-02-07 ENCOUNTER — APPOINTMENT (OUTPATIENT)
Dept: ELECTROPHYSIOLOGY | Facility: CLINIC | Age: 89
End: 2025-02-07
Payer: MEDICARE

## 2025-02-07 VITALS
DIASTOLIC BLOOD PRESSURE: 78 MMHG | HEIGHT: 62 IN | HEART RATE: 75 BPM | OXYGEN SATURATION: 96 % | BODY MASS INDEX: 23.89 KG/M2 | SYSTOLIC BLOOD PRESSURE: 126 MMHG | WEIGHT: 129.8 LBS

## 2025-02-07 DIAGNOSIS — E78.00 PURE HYPERCHOLESTEROLEMIA, UNSPECIFIED: ICD-10-CM

## 2025-02-07 DIAGNOSIS — I10 ESSENTIAL (PRIMARY) HYPERTENSION: ICD-10-CM

## 2025-02-07 DIAGNOSIS — R55 SYNCOPE AND COLLAPSE: ICD-10-CM

## 2025-02-07 PROCEDURE — 99214 OFFICE O/P EST MOD 30 MIN: CPT

## 2025-02-07 PROCEDURE — 93000 ELECTROCARDIOGRAM COMPLETE: CPT

## 2025-02-09 ENCOUNTER — NON-APPOINTMENT (OUTPATIENT)
Age: 89
End: 2025-02-09

## 2025-06-05 NOTE — ED PROVIDER NOTE - HIV OFFER
[facility]   8/1/2025  3:00 PM Laura Goldberg MD [facility]       
Opt out
